# Patient Record
Sex: FEMALE | Race: OTHER | Employment: UNEMPLOYED | URBAN - METROPOLITAN AREA
[De-identification: names, ages, dates, MRNs, and addresses within clinical notes are randomized per-mention and may not be internally consistent; named-entity substitution may affect disease eponyms.]

---

## 2020-05-03 ENCOUNTER — APPOINTMENT (OUTPATIENT)
Dept: CT IMAGING | Age: 20
End: 2020-05-03
Attending: EMERGENCY MEDICINE
Payer: COMMERCIAL

## 2020-05-03 ENCOUNTER — APPOINTMENT (OUTPATIENT)
Dept: ULTRASOUND IMAGING | Age: 20
End: 2020-05-03
Attending: EMERGENCY MEDICINE
Payer: COMMERCIAL

## 2020-05-03 ENCOUNTER — HOSPITAL ENCOUNTER (EMERGENCY)
Age: 20
Discharge: HOME | End: 2020-05-03
Attending: EMERGENCY MEDICINE
Payer: COMMERCIAL

## 2020-05-03 VITALS
OXYGEN SATURATION: 98 % | DIASTOLIC BLOOD PRESSURE: 74 MMHG | HEART RATE: 119 BPM | WEIGHT: 130 LBS | TEMPERATURE: 98.9 F | BODY MASS INDEX: 27.29 KG/M2 | SYSTOLIC BLOOD PRESSURE: 108 MMHG | RESPIRATION RATE: 18 BRPM | HEIGHT: 58 IN

## 2020-05-03 DIAGNOSIS — R10.84 ABDOMINAL PAIN, GENERALIZED: Primary | ICD-10-CM

## 2020-05-03 DIAGNOSIS — N12 PYELONEPHRITIS: ICD-10-CM

## 2020-05-03 LAB
ALBUMIN SERPL-MCNC: 4.1 G/DL (ref 3.5–5)
ALBUMIN/GLOB SERPL: 0.8 {RATIO} (ref 1.2–3.5)
ALP SERPL-CCNC: 101 U/L (ref 50–136)
ALT SERPL-CCNC: 23 U/L (ref 12–65)
ANION GAP SERPL CALC-SCNC: 9 MMOL/L (ref 7–16)
AST SERPL-CCNC: 16 U/L (ref 15–37)
BACTERIA URNS QL MICRO: 0 /HPF
BASOPHILS # BLD: 0.1 K/UL (ref 0–0.2)
BASOPHILS NFR BLD: 0 % (ref 0–2)
BILIRUB SERPL-MCNC: 2.1 MG/DL (ref 0.2–1.1)
BUN SERPL-MCNC: 10 MG/DL (ref 6–23)
CALCIUM SERPL-MCNC: 9.7 MG/DL (ref 8.3–10.4)
CASTS URNS QL MICRO: ABNORMAL /LPF
CHLORIDE SERPL-SCNC: 101 MMOL/L (ref 98–107)
CO2 SERPL-SCNC: 25 MMOL/L (ref 21–32)
CREAT SERPL-MCNC: 0.85 MG/DL (ref 0.6–1)
DIFFERENTIAL METHOD BLD: ABNORMAL
EOSINOPHIL # BLD: 0 K/UL (ref 0–0.8)
EOSINOPHIL NFR BLD: 0 % (ref 0.5–7.8)
EPI CELLS #/AREA URNS HPF: ABNORMAL /HPF
ERYTHROCYTE [DISTWIDTH] IN BLOOD BY AUTOMATED COUNT: 13.2 % (ref 11.9–14.6)
GLOBULIN SER CALC-MCNC: 4.9 G/DL (ref 2.3–3.5)
GLUCOSE SERPL-MCNC: 119 MG/DL (ref 65–100)
HCG UR QL: NEGATIVE
HCT VFR BLD AUTO: 42.3 % (ref 35.8–46.3)
HGB BLD-MCNC: 13.7 G/DL (ref 11.7–15.4)
IMM GRANULOCYTES # BLD AUTO: 0.1 K/UL (ref 0–0.5)
IMM GRANULOCYTES NFR BLD AUTO: 1 % (ref 0–5)
LACTATE SERPL-SCNC: 1.2 MMOL/L (ref 0.4–2)
LIPASE SERPL-CCNC: 38 U/L (ref 73–393)
LYMPHOCYTES # BLD: 0.9 K/UL (ref 0.5–4.6)
LYMPHOCYTES NFR BLD: 4 % (ref 13–44)
MCH RBC QN AUTO: 28.2 PG (ref 26.1–32.9)
MCHC RBC AUTO-ENTMCNC: 32.4 G/DL (ref 31.4–35)
MCV RBC AUTO: 87.2 FL (ref 79.6–97.8)
MONOCYTES # BLD: 1.9 K/UL (ref 0.1–1.3)
MONOCYTES NFR BLD: 8 % (ref 4–12)
NEUTS SEG # BLD: 21.6 K/UL (ref 1.7–8.2)
NEUTS SEG NFR BLD: 88 % (ref 43–78)
NRBC # BLD: 0 K/UL (ref 0–0.2)
PLATELET # BLD AUTO: 325 K/UL (ref 150–450)
PMV BLD AUTO: 10.6 FL (ref 9.4–12.3)
POTASSIUM SERPL-SCNC: 3.2 MMOL/L (ref 3.5–5.1)
PROT SERPL-MCNC: 9 G/DL (ref 6.3–8.2)
RBC # BLD AUTO: 4.85 M/UL (ref 4.05–5.2)
RBC #/AREA URNS HPF: ABNORMAL /HPF
SERVICE CMNT-IMP: NORMAL
SODIUM SERPL-SCNC: 135 MMOL/L (ref 136–145)
WBC # BLD AUTO: 24.6 K/UL (ref 4.3–11.1)
WBC URNS QL MICRO: >100 /HPF
WET PREP GENITAL: NORMAL
WET PREP GENITAL: NORMAL

## 2020-05-03 PROCEDURE — 74011250636 HC RX REV CODE- 250/636: Performed by: EMERGENCY MEDICINE

## 2020-05-03 PROCEDURE — 83605 ASSAY OF LACTIC ACID: CPT

## 2020-05-03 PROCEDURE — 87205 SMEAR GRAM STAIN: CPT

## 2020-05-03 PROCEDURE — 99284 EMERGENCY DEPT VISIT MOD MDM: CPT

## 2020-05-03 PROCEDURE — 96375 TX/PRO/DX INJ NEW DRUG ADDON: CPT

## 2020-05-03 PROCEDURE — 96365 THER/PROPH/DIAG IV INF INIT: CPT

## 2020-05-03 PROCEDURE — 87040 BLOOD CULTURE FOR BACTERIA: CPT

## 2020-05-03 PROCEDURE — 76856 US EXAM PELVIC COMPLETE: CPT

## 2020-05-03 PROCEDURE — 87186 SC STD MICRODIL/AGAR DIL: CPT

## 2020-05-03 PROCEDURE — 87591 N.GONORRHOEAE DNA AMP PROB: CPT

## 2020-05-03 PROCEDURE — 81003 URINALYSIS AUTO W/O SCOPE: CPT

## 2020-05-03 PROCEDURE — 85025 COMPLETE CBC W/AUTO DIFF WBC: CPT

## 2020-05-03 PROCEDURE — 81015 MICROSCOPIC EXAM OF URINE: CPT

## 2020-05-03 PROCEDURE — 80053 COMPREHEN METABOLIC PANEL: CPT

## 2020-05-03 PROCEDURE — 81025 URINE PREGNANCY TEST: CPT

## 2020-05-03 PROCEDURE — 83690 ASSAY OF LIPASE: CPT

## 2020-05-03 PROCEDURE — 87210 SMEAR WET MOUNT SALINE/INK: CPT

## 2020-05-03 PROCEDURE — 87088 URINE BACTERIA CULTURE: CPT

## 2020-05-03 PROCEDURE — 74011000258 HC RX REV CODE- 258: Performed by: EMERGENCY MEDICINE

## 2020-05-03 PROCEDURE — 87086 URINE CULTURE/COLONY COUNT: CPT

## 2020-05-03 PROCEDURE — 74176 CT ABD & PELVIS W/O CONTRAST: CPT

## 2020-05-03 PROCEDURE — 74011250637 HC RX REV CODE- 250/637: Performed by: EMERGENCY MEDICINE

## 2020-05-03 PROCEDURE — 76705 ECHO EXAM OF ABDOMEN: CPT

## 2020-05-03 PROCEDURE — 87150 DNA/RNA AMPLIFIED PROBE: CPT

## 2020-05-03 PROCEDURE — 87077 CULTURE AEROBIC IDENTIFY: CPT

## 2020-05-03 RX ORDER — ONDANSETRON 4 MG/1
4 TABLET, ORALLY DISINTEGRATING ORAL
Qty: 11 TAB | Refills: 1 | Status: SHIPPED | OUTPATIENT
Start: 2020-05-03 | End: 2020-05-07

## 2020-05-03 RX ORDER — ACETAMINOPHEN 500 MG
1000 TABLET ORAL
Status: COMPLETED | OUTPATIENT
Start: 2020-05-03 | End: 2020-05-03

## 2020-05-03 RX ORDER — ONDANSETRON 2 MG/ML
4 INJECTION INTRAMUSCULAR; INTRAVENOUS
Status: COMPLETED | OUTPATIENT
Start: 2020-05-03 | End: 2020-05-03

## 2020-05-03 RX ORDER — SODIUM CHLORIDE 0.9 % (FLUSH) 0.9 %
10 SYRINGE (ML) INJECTION
Status: DISCONTINUED | OUTPATIENT
Start: 2020-05-03 | End: 2020-05-04 | Stop reason: HOSPADM

## 2020-05-03 RX ORDER — HYDROMORPHONE HYDROCHLORIDE 1 MG/ML
0.5 INJECTION, SOLUTION INTRAMUSCULAR; INTRAVENOUS; SUBCUTANEOUS
Status: COMPLETED | OUTPATIENT
Start: 2020-05-03 | End: 2020-05-03

## 2020-05-03 RX ORDER — CEPHALEXIN 500 MG/1
500 CAPSULE ORAL 4 TIMES DAILY
Qty: 28 CAP | Refills: 0 | Status: SHIPPED | OUTPATIENT
Start: 2020-05-03 | End: 2020-05-04

## 2020-05-03 RX ADMIN — ACETAMINOPHEN 1000 MG: 500 TABLET ORAL at 21:59

## 2020-05-03 RX ADMIN — SODIUM CHLORIDE 1000 ML: 900 INJECTION, SOLUTION INTRAVENOUS at 18:39

## 2020-05-03 RX ADMIN — ONDANSETRON 4 MG: 2 INJECTION INTRAMUSCULAR; INTRAVENOUS at 18:40

## 2020-05-03 RX ADMIN — CEFTRIAXONE 1 G: 1 INJECTION, POWDER, FOR SOLUTION INTRAMUSCULAR; INTRAVENOUS at 21:59

## 2020-05-03 RX ADMIN — HYDROMORPHONE HYDROCHLORIDE 0.5 MG: 1 INJECTION, SOLUTION INTRAMUSCULAR; INTRAVENOUS; SUBCUTANEOUS at 18:40

## 2020-05-03 NOTE — ED PROVIDER NOTES
Patient is a 65-year-old female who presents with nausea, vomiting, abdominal and lower back pain and fevers. Patient states symptoms began last night, states gradually worsening today. Denies any vaginal bleeding or discharge, no chest pain, no cough, no neck pain or stiffness, no headache, no further complaints. No past medical history on file. No past surgical history on file. No family history on file. Social History     Socioeconomic History    Marital status: SINGLE     Spouse name: Not on file    Number of children: Not on file    Years of education: Not on file    Highest education level: Not on file   Occupational History    Not on file   Social Needs    Financial resource strain: Not on file    Food insecurity     Worry: Not on file     Inability: Not on file    Transportation needs     Medical: Not on file     Non-medical: Not on file   Tobacco Use    Smoking status: Not on file   Substance and Sexual Activity    Alcohol use: Not on file    Drug use: Not on file    Sexual activity: Not on file   Lifestyle    Physical activity     Days per week: Not on file     Minutes per session: Not on file    Stress: Not on file   Relationships    Social connections     Talks on phone: Not on file     Gets together: Not on file     Attends Yazdanism service: Not on file     Active member of club or organization: Not on file     Attends meetings of clubs or organizations: Not on file     Relationship status: Not on file    Intimate partner violence     Fear of current or ex partner: Not on file     Emotionally abused: Not on file     Physically abused: Not on file     Forced sexual activity: Not on file   Other Topics Concern    Not on file   Social History Narrative    Not on file         ALLERGIES: Patient has no known allergies. Review of Systems   Constitutional: Positive for chills and fever. HENT: Negative for rhinorrhea and sore throat.     Eyes: Negative for visual disturbance. Respiratory: Negative for cough and shortness of breath. Cardiovascular: Negative for chest pain and leg swelling. Gastrointestinal: Positive for abdominal pain, nausea and vomiting. Negative for diarrhea. Genitourinary: Negative for dysuria, vaginal bleeding and vaginal discharge. Musculoskeletal: Negative for neck pain. Skin: Negative for rash. Neurological: Negative for weakness and headaches. Psychiatric/Behavioral: The patient is not nervous/anxious. Vitals:    05/03/20 1734   BP: 124/78   Pulse: (!) 121   Resp: 16   Temp: 100 °F (37.8 °C)   SpO2: 96%   Weight: 59 kg (130 lb)   Height: 4' 10\" (1.473 m)            Physical Exam  Vitals signs and nursing note reviewed. Constitutional:       Appearance: She is well-developed. HENT:      Head: Normocephalic. Right Ear: External ear normal.      Left Ear: External ear normal.   Eyes:      Conjunctiva/sclera: Conjunctivae normal.      Pupils: Pupils are equal, round, and reactive to light. Neck:      Musculoskeletal: Normal range of motion and neck supple. Trachea: No tracheal deviation. Cardiovascular:      Rate and Rhythm: Normal rate and regular rhythm. Pulmonary:      Effort: Pulmonary effort is normal. No respiratory distress. Abdominal:      General: There is no distension. Palpations: Abdomen is soft. Tenderness: There is abdominal tenderness (Mild lower abdominal tenderness without rebound or guarding in bilateral lower abdomen. ). There is no guarding or rebound. Musculoskeletal: Normal range of motion. Skin:     Findings: No rash. Neurological:      Mental Status: She is alert and oriented to person, place, and time. Cranial Nerves: No cranial nerve deficit.           MDM  Number of Diagnoses or Management Options  Abdominal pain, generalized:   Diagnosis management comments: 10:24 PM  Patient had been seen and evaluated by Dr. Jeni Trevizo the hospitalist came to admit her and patient informed the hospitalist that she could not be admitted tonight because she did could not find someone to keep her children. She is feeling much better now. She has received Rocephin for the pyelonephritis. Patient understands the severity of her infection. I will prescribe some Zofran and oral antibiotic and advised her to please come back if it gets worse. She states if that is the case she will come back tomorrow. Ray Marks MD         Amount and/or Complexity of Data Reviewed  Clinical lab tests: ordered and reviewed  Tests in the radiology section of CPT®: ordered and reviewed  Tests in the medicine section of CPT®: ordered and reviewed  Review and summarize past medical records: yes    Risk of Complications, Morbidity, and/or Mortality  Presenting problems: high  Diagnostic procedures: high  Management options: high    Patient Progress  Patient progress: stable         Procedures  Recent Results (from the past 12 hour(s))   CBC WITH AUTOMATED DIFF    Collection Time: 05/03/20  5:37 PM   Result Value Ref Range    WBC 24.6 (H) 4.3 - 11.1 K/uL    RBC 4.85 4.05 - 5.2 M/uL    HGB 13.7 11.7 - 15.4 g/dL    HCT 42.3 35.8 - 46.3 %    MCV 87.2 79.6 - 97.8 FL    MCH 28.2 26.1 - 32.9 PG    MCHC 32.4 31.4 - 35.0 g/dL    RDW 13.2 11.9 - 14.6 %    PLATELET 284 562 - 251 K/uL    MPV 10.6 9.4 - 12.3 FL    ABSOLUTE NRBC 0.00 0.0 - 0.2 K/uL    DF AUTOMATED      NEUTROPHILS 88 (H) 43 - 78 %    LYMPHOCYTES 4 (L) 13 - 44 %    MONOCYTES 8 4.0 - 12.0 %    EOSINOPHILS 0 (L) 0.5 - 7.8 %    BASOPHILS 0 0.0 - 2.0 %    IMMATURE GRANULOCYTES 1 0.0 - 5.0 %    ABS. NEUTROPHILS 21.6 (H) 1.7 - 8.2 K/UL    ABS. LYMPHOCYTES 0.9 0.5 - 4.6 K/UL    ABS. MONOCYTES 1.9 (H) 0.1 - 1.3 K/UL    ABS. EOSINOPHILS 0.0 0.0 - 0.8 K/UL    ABS. BASOPHILS 0.1 0.0 - 0.2 K/UL    ABS. IMM.  GRANS. 0.1 0.0 - 0.5 K/UL   METABOLIC PANEL, COMPREHENSIVE    Collection Time: 05/03/20  5:37 PM   Result Value Ref Range    Sodium 135 (L) 136 - 145 mmol/L Potassium 3.2 (L) 3.5 - 5.1 mmol/L    Chloride 101 98 - 107 mmol/L    CO2 25 21 - 32 mmol/L    Anion gap 9 7 - 16 mmol/L    Glucose 119 (H) 65 - 100 mg/dL    BUN 10 6 - 23 MG/DL    Creatinine 0.85 0.6 - 1.0 MG/DL    GFR est AA >60 >60 ml/min/1.73m2    GFR est non-AA >60 >60 ml/min/1.73m2    Calcium 9.7 8.3 - 10.4 MG/DL    Bilirubin, total 2.1 (H) 0.2 - 1.1 MG/DL    ALT (SGPT) 23 12 - 65 U/L    AST (SGOT) 16 15 - 37 U/L    Alk. phosphatase 101 50 - 136 U/L    Protein, total 9.0 (H) 6.3 - 8.2 g/dL    Albumin 4.1 3.5 - 5.0 g/dL    Globulin 4.9 (H) 2.3 - 3.5 g/dL    A-G Ratio 0.8 (L) 1.2 - 3.5     Ct Abd Pelv Wo Cont    Result Date: 5/3/2020  CT ABDOMEN AND PELVIS WITHOUT CONTRAST HISTORY: Nausea, vomiting, diarrhea and fever. COMPARISON: None TECHNIQUE: Helical imaging was performed from the lung bases through the ischial tuberosities without intravenous contrast. Oral contrast was not administered. Coronal and sagittal reformats were performed. Dose reduction techniques used: Automated exposure control, adjustment of the mAs and/or kVp according to patient's size, and iterative reconstruction techniques. FINDINGS: *  LUNG BASES: Within normal limits. *  LIVER: Within normal limits. *  GALLBLADDER AND BILE DUCTS: Normal. *  SPLEEN: Within normal limits. *  URINARY TRACT: Within normal limits. *  ADRENALS: Within normal limits. *  PANCREAS: Within normal limits. *  GASTROINTESTINAL TRACT: Within normal limits. *  RETROPERITONEUM: Within normal limits. *  PERITONEAL CAVITY AND ABDOMINAL WALL: Within normal limits. *  PELVIS: IUD within the endometrial canal. 3.1 x 2.7 cm right ovarian cyst. Small amount of cul-de-sac fluid. *  SPINE / BONES: Within normal limits. *  OTHER COMMENTS: None. IMPRESSION: Right ovarian cyst with a small amount of cul-de-sac fluid.  IUD within the endometrial canal.  Evaluation of the abdominal viscera is limited without intravenous contrast. Date of Dictation: 5/3/2020 7:55 PM     [employer]    Result Date: 5/3/2020  Right upper quadrant abdominal ultrasound History: gallbladder-left lower quadrant abd pain for 3 days, fever, elevated WBC, 19 years Female nausea vomiting diarrhea Comparison: None available. Findings: The liver is normal in echotexture and there is no dilatation of the intrahepatic ducts or evidence of focal mass. The liver measures 16.4 cm in midclavicular length. Normal hepatopedal flow within the main portal vein. The gallbladder is normal-appearing. No evidence of cholelithiasis, gallbladder wall thickening, or pericholecystic fluid. Negative sonographic [place] sign. The proximal common duct is not dilated and measures 4 mm. The right kidney is normal appearing and measures 9.2 cm in length. Partially visualized head and body of the pancreas appear unremarkable. The visualized portions of the IVC and aorta are unremarkable. No ascites or right pleural effusion. Impression: Normal study. Us Pelv Non Obs    Result Date: 5/3/2020  Examination:  Transabdominal ultrasound for evaluation of the pelvic contents. History: rule out torsion, 19 years Female; Patient's last menstrual period was unknown. Negative pregnancy test.  Patient has IUD. Presents with frequency, irregular menses, left lower quadrant pain for 3 days. Patient declined transvaginal imaging. Comparison: None available Findings: Uterus measures approximately 8.2 x 3.8 x 6.6 cm. Myometrium has a background normal uniform echotexture. Endometrial stripe measures approximately 0.4 cm. An intrauterine device appears in relative anatomic position within the endometrial cavity. The right and left ovary were well-visualized. Right ovary measures approximately 5.2 x 3.5 x 2.9 cm, and appears to contain a simple appearing cyst measuring 2.4 x 3.7 x 2.9 cm, without flow, likely functional. Left ovary measures approximately 2.8 x 1.9 x 2.0 cm. Normal Doppler flow in bilateral ovaries.   Trace simple free fluid was seen within the cul-de-sac, likely physiologic. Impression:  No acute pathology identified. Intrauterine device appears in relative anatomic position. Probable right ovarian functional cyst.      65-year-old female with pyelo:      Elevated WBC with flank pain and UTI concerning for pyelo in setting of normal workup as above so discussed with hospitalist for admission for IV abx and further management given persistent vomiting and inability to tolerate oral intake.

## 2020-05-03 NOTE — ED TRIAGE NOTES
Pt ambulatory to triage without complications. Pt reports n/v/d, chills, and fever since last night. Pt denies cough and SOB. Pt reports urinary frequency and burning upon urination but states is getting better. Pt states she is on bc that makes her period irregular and had period last month for about 3 weeks. Pt has IUD. Pt reports lower back pain and had lower abd pain last night.

## 2020-05-04 ENCOUNTER — HOSPITAL ENCOUNTER (INPATIENT)
Age: 20
LOS: 3 days | Discharge: HOME | DRG: 690 | End: 2020-05-07
Attending: EMERGENCY MEDICINE | Admitting: INTERNAL MEDICINE
Payer: COMMERCIAL

## 2020-05-04 DIAGNOSIS — R11.2 INTRACTABLE NAUSEA AND VOMITING: ICD-10-CM

## 2020-05-04 DIAGNOSIS — N12 PYELONEPHRITIS: Primary | ICD-10-CM

## 2020-05-04 DIAGNOSIS — R78.81 E COLI BACTEREMIA: ICD-10-CM

## 2020-05-04 DIAGNOSIS — D72.829 LEUKOCYTOSIS, UNSPECIFIED TYPE: ICD-10-CM

## 2020-05-04 DIAGNOSIS — B96.20 E COLI BACTEREMIA: ICD-10-CM

## 2020-05-04 PROBLEM — E87.6 HYPOKALEMIA: Status: ACTIVE | Noted: 2020-05-04

## 2020-05-04 LAB
ACC. NO. FROM MICRO ORDER, ACCP: ABNORMAL
ALBUMIN SERPL-MCNC: 3.5 G/DL (ref 3.5–5)
ALBUMIN/GLOB SERPL: 0.7 {RATIO} (ref 1.2–3.5)
ALP SERPL-CCNC: 100 U/L (ref 50–136)
ALT SERPL-CCNC: 23 U/L (ref 12–65)
ANION GAP SERPL CALC-SCNC: 11 MMOL/L (ref 7–16)
APPEARANCE UR: ABNORMAL
AST SERPL-CCNC: 19 U/L (ref 15–37)
BACTERIA URNS QL MICRO: 0 /HPF
BASOPHILS # BLD: 0.1 K/UL (ref 0–0.2)
BASOPHILS NFR BLD: 0 % (ref 0–2)
BILIRUB SERPL-MCNC: 2 MG/DL (ref 0.2–1.1)
BILIRUB UR QL: ABNORMAL
BUN SERPL-MCNC: 9 MG/DL (ref 6–23)
CALCIUM SERPL-MCNC: 9.1 MG/DL (ref 8.3–10.4)
CHLORIDE SERPL-SCNC: 98 MMOL/L (ref 98–107)
CO2 SERPL-SCNC: 25 MMOL/L (ref 21–32)
COLOR UR: ABNORMAL
CREAT SERPL-MCNC: 0.86 MG/DL (ref 0.6–1)
DIFFERENTIAL METHOD BLD: ABNORMAL
EOSINOPHIL # BLD: 0 K/UL (ref 0–0.8)
EOSINOPHIL NFR BLD: 0 % (ref 0.5–7.8)
EPI CELLS #/AREA URNS HPF: ABNORMAL /HPF
ERYTHROCYTE [DISTWIDTH] IN BLOOD BY AUTOMATED COUNT: 13.4 % (ref 11.9–14.6)
ESCHERICHIA COLI: DETECTED
GLOBULIN SER CALC-MCNC: 4.8 G/DL (ref 2.3–3.5)
GLUCOSE SERPL-MCNC: 110 MG/DL (ref 65–100)
GLUCOSE UR STRIP.AUTO-MCNC: 100 MG/DL
HCT VFR BLD AUTO: 38.6 % (ref 35.8–46.3)
HGB BLD-MCNC: 12.6 G/DL (ref 11.7–15.4)
HGB UR QL STRIP: ABNORMAL
IMM GRANULOCYTES # BLD AUTO: 0.2 K/UL (ref 0–0.5)
IMM GRANULOCYTES NFR BLD AUTO: 1 % (ref 0–5)
INTERPRETATION: ABNORMAL
KETONES UR QL STRIP.AUTO: NEGATIVE MG/DL
KPC (CARBAPENEM RESISTANCE GENE): NOT DETECTED
LACTATE SERPL-SCNC: 1.6 MMOL/L (ref 0.4–2)
LEUKOCYTE ESTERASE UR QL STRIP.AUTO: ABNORMAL
LYMPHOCYTES # BLD: 0.9 K/UL (ref 0.5–4.6)
LYMPHOCYTES NFR BLD: 3 % (ref 13–44)
MAGNESIUM SERPL-MCNC: 2 MG/DL (ref 1.8–2.4)
MCH RBC QN AUTO: 28.3 PG (ref 26.1–32.9)
MCHC RBC AUTO-ENTMCNC: 32.6 G/DL (ref 31.4–35)
MCV RBC AUTO: 86.5 FL (ref 79.6–97.8)
MONOCYTES # BLD: 2.7 K/UL (ref 0.1–1.3)
MONOCYTES NFR BLD: 10 % (ref 4–12)
NEUTS SEG # BLD: 23.2 K/UL (ref 1.7–8.2)
NEUTS SEG NFR BLD: 86 % (ref 43–78)
NITRITE UR QL STRIP.AUTO: NEGATIVE
NRBC # BLD: 0 K/UL (ref 0–0.2)
PH UR STRIP: 6.5 [PH] (ref 5–9)
PLATELET # BLD AUTO: 268 K/UL (ref 150–450)
PMV BLD AUTO: 11 FL (ref 9.4–12.3)
POTASSIUM SERPL-SCNC: 3 MMOL/L (ref 3.5–5.1)
PROT SERPL-MCNC: 8.3 G/DL (ref 6.3–8.2)
PROT UR STRIP-MCNC: 100 MG/DL
RBC # BLD AUTO: 4.46 M/UL (ref 4.05–5.2)
RBC #/AREA URNS HPF: ABNORMAL /HPF
SODIUM SERPL-SCNC: 134 MMOL/L (ref 136–145)
SP GR UR REFRACTOMETRY: 1.02 (ref 1–1.02)
UROBILINOGEN UR QL STRIP.AUTO: 2 EU/DL (ref 0.2–1)
WBC # BLD AUTO: 27.1 K/UL (ref 4.3–11.1)
WBC URNS QL MICRO: ABNORMAL /HPF

## 2020-05-04 PROCEDURE — 74011250636 HC RX REV CODE- 250/636: Performed by: EMERGENCY MEDICINE

## 2020-05-04 PROCEDURE — 81001 URINALYSIS AUTO W/SCOPE: CPT

## 2020-05-04 PROCEDURE — 83605 ASSAY OF LACTIC ACID: CPT

## 2020-05-04 PROCEDURE — 85025 COMPLETE CBC W/AUTO DIFF WBC: CPT

## 2020-05-04 PROCEDURE — 83735 ASSAY OF MAGNESIUM: CPT

## 2020-05-04 PROCEDURE — 99284 EMERGENCY DEPT VISIT MOD MDM: CPT

## 2020-05-04 PROCEDURE — 77030040361 HC SLV COMPR DVT MDII -B

## 2020-05-04 PROCEDURE — 74011000258 HC RX REV CODE- 258: Performed by: EMERGENCY MEDICINE

## 2020-05-04 PROCEDURE — 80053 COMPREHEN METABOLIC PANEL: CPT

## 2020-05-04 PROCEDURE — 74011250637 HC RX REV CODE- 250/637: Performed by: INTERNAL MEDICINE

## 2020-05-04 PROCEDURE — 74011250636 HC RX REV CODE- 250/636: Performed by: INTERNAL MEDICINE

## 2020-05-04 PROCEDURE — 96374 THER/PROPH/DIAG INJ IV PUSH: CPT

## 2020-05-04 PROCEDURE — 65270000029 HC RM PRIVATE

## 2020-05-04 PROCEDURE — 87040 BLOOD CULTURE FOR BACTERIA: CPT

## 2020-05-04 RX ORDER — SODIUM CHLORIDE 0.9 % (FLUSH) 0.9 %
5-40 SYRINGE (ML) INJECTION EVERY 8 HOURS
Status: DISCONTINUED | OUTPATIENT
Start: 2020-05-04 | End: 2020-05-07 | Stop reason: HOSPADM

## 2020-05-04 RX ORDER — SODIUM CHLORIDE 9 MG/ML
100 INJECTION, SOLUTION INTRAVENOUS CONTINUOUS
Status: DISCONTINUED | OUTPATIENT
Start: 2020-05-04 | End: 2020-05-04

## 2020-05-04 RX ORDER — ONDANSETRON 2 MG/ML
4 INJECTION INTRAMUSCULAR; INTRAVENOUS
Status: DISCONTINUED | OUTPATIENT
Start: 2020-05-04 | End: 2020-05-07 | Stop reason: HOSPADM

## 2020-05-04 RX ORDER — ACETAMINOPHEN 325 MG/1
650 TABLET ORAL
Status: DISCONTINUED | OUTPATIENT
Start: 2020-05-04 | End: 2020-05-07 | Stop reason: HOSPADM

## 2020-05-04 RX ORDER — SODIUM CHLORIDE 0.9 % (FLUSH) 0.9 %
5-40 SYRINGE (ML) INJECTION AS NEEDED
Status: DISCONTINUED | OUTPATIENT
Start: 2020-05-04 | End: 2020-05-07 | Stop reason: HOSPADM

## 2020-05-04 RX ORDER — SODIUM CHLORIDE 9 MG/ML
100 INJECTION, SOLUTION INTRAVENOUS CONTINUOUS
Status: DISPENSED | OUTPATIENT
Start: 2020-05-04 | End: 2020-05-05

## 2020-05-04 RX ORDER — AMOXICILLIN 250 MG
2 CAPSULE ORAL
Status: DISCONTINUED | OUTPATIENT
Start: 2020-05-04 | End: 2020-05-07 | Stop reason: HOSPADM

## 2020-05-04 RX ORDER — POTASSIUM CHLORIDE 20 MEQ/1
40 TABLET, EXTENDED RELEASE ORAL
Status: COMPLETED | OUTPATIENT
Start: 2020-05-04 | End: 2020-05-04

## 2020-05-04 RX ORDER — LORAZEPAM 0.5 MG/1
0.5 TABLET ORAL
Status: DISCONTINUED | OUTPATIENT
Start: 2020-05-04 | End: 2020-05-07 | Stop reason: HOSPADM

## 2020-05-04 RX ORDER — DIPHENHYDRAMINE HCL 25 MG
25 CAPSULE ORAL
Status: DISCONTINUED | OUTPATIENT
Start: 2020-05-04 | End: 2020-05-07 | Stop reason: HOSPADM

## 2020-05-04 RX ORDER — NALOXONE HYDROCHLORIDE 0.4 MG/ML
0.4 INJECTION, SOLUTION INTRAMUSCULAR; INTRAVENOUS; SUBCUTANEOUS AS NEEDED
Status: DISCONTINUED | OUTPATIENT
Start: 2020-05-04 | End: 2020-05-07 | Stop reason: HOSPADM

## 2020-05-04 RX ADMIN — ACETAMINOPHEN 650 MG: 325 TABLET, FILM COATED ORAL at 16:08

## 2020-05-04 RX ADMIN — POTASSIUM CHLORIDE 40 MEQ: 20 TABLET, EXTENDED RELEASE ORAL at 16:49

## 2020-05-04 RX ADMIN — SODIUM CHLORIDE 100 ML/HR: 9 INJECTION, SOLUTION INTRAVENOUS at 16:06

## 2020-05-04 RX ADMIN — ONDANSETRON 4 MG: 2 INJECTION INTRAMUSCULAR; INTRAVENOUS at 16:43

## 2020-05-04 RX ADMIN — Medication 10 ML: at 20:48

## 2020-05-04 RX ADMIN — CEFTRIAXONE 1 G: 1 INJECTION, POWDER, FOR SOLUTION INTRAMUSCULAR; INTRAVENOUS at 13:37

## 2020-05-04 RX ADMIN — ACETAMINOPHEN 650 MG: 325 TABLET, FILM COATED ORAL at 20:47

## 2020-05-04 RX ADMIN — SODIUM CHLORIDE 100 ML/HR: 9 INJECTION, SOLUTION INTRAVENOUS at 16:47

## 2020-05-04 RX ADMIN — SODIUM CHLORIDE 1000 ML: 9 INJECTION, SOLUTION INTRAVENOUS at 13:37

## 2020-05-04 NOTE — ED PROVIDER NOTES
Patient was seen here yesterday and diagnosed with pyelonephritis. She states we wanted to admit her but she had no childcare. She received IV antibiotics and fluids and was feeling much better and went home. She states that she has been taking her nausea medicine but continues to vomit. Anytime she drinks something she vomits. She continues to have back pain and states that her fever has come back. Since she was feeling worse and unable to keep anything down, she came back for reevaluation. No past medical history on file. No past surgical history on file. No family history on file.     Social History     Socioeconomic History    Marital status: SINGLE     Spouse name: Not on file    Number of children: Not on file    Years of education: Not on file    Highest education level: Not on file   Occupational History    Not on file   Social Needs    Financial resource strain: Not on file    Food insecurity     Worry: Not on file     Inability: Not on file    Transportation needs     Medical: Not on file     Non-medical: Not on file   Tobacco Use    Smoking status: Not on file   Substance and Sexual Activity    Alcohol use: Not on file    Drug use: Not on file    Sexual activity: Not on file   Lifestyle    Physical activity     Days per week: Not on file     Minutes per session: Not on file    Stress: Not on file   Relationships    Social connections     Talks on phone: Not on file     Gets together: Not on file     Attends Anabaptism service: Not on file     Active member of club or organization: Not on file     Attends meetings of clubs or organizations: Not on file     Relationship status: Not on file    Intimate partner violence     Fear of current or ex partner: Not on file     Emotionally abused: Not on file     Physically abused: Not on file     Forced sexual activity: Not on file   Other Topics Concern    Not on file   Social History Narrative    Not on file ALLERGIES: Patient has no known allergies. Review of Systems   Constitutional: Positive for chills and fever. Gastrointestinal: Positive for nausea and vomiting. All other systems reviewed and are negative. Vitals:    05/04/20 1232 05/04/20 1300   BP: 104/70 112/69   Pulse: (!) 105 (!) 116   Resp: 20    Temp: 100.3 °F (37.9 °C)    SpO2: 97% 98%   Weight: 59 kg (130 lb)    Height: 4' 10\" (1.473 m)             Physical Exam  Vitals signs and nursing note reviewed. Constitutional:       Appearance: Normal appearance. She is well-developed. HENT:      Head: Normocephalic and atraumatic. Eyes:      Conjunctiva/sclera: Conjunctivae normal.      Pupils: Pupils are equal, round, and reactive to light. Neck:      Musculoskeletal: Normal range of motion and neck supple. Cardiovascular:      Rate and Rhythm: Regular rhythm. Tachycardia present. Heart sounds: Normal heart sounds. Pulmonary:      Effort: Pulmonary effort is normal. No respiratory distress. Breath sounds: Normal breath sounds. Abdominal:      General: Bowel sounds are normal. There is no distension. Palpations: Abdomen is soft. Tenderness: There is no abdominal tenderness. Musculoskeletal:         General: No tenderness. Skin:     General: Skin is warm and dry. Neurological:      Mental Status: She is alert and oriented to person, place, and time. Psychiatric:         Mood and Affect: Mood normal.         Behavior: Behavior normal.          MDM  Number of Diagnoses or Management Options  E coli bacteremia: new and requires workup  Intractable nausea and vomiting: new and requires workup  Leukocytosis, unspecified type: new and requires workup  Pyelonephritis: new and requires workup  Diagnosis management comments: 1:38 PM per old records, her recent blood culture grew E. coli. Patient has worsened at home. We tried to admit her yesterday but she could not stay as she had no childcare.   She states that she will stay today. I have texted the hospitalist, IV antibiotics have been started along with IV fluids.        Amount and/or Complexity of Data Reviewed  Clinical lab tests: ordered and reviewed  Decide to obtain previous medical records or to obtain history from someone other than the patient: yes  Review and summarize past medical records: yes  Discuss the patient with other providers: yes    Risk of Complications, Morbidity, and/or Mortality  Presenting problems: moderate  Diagnostic procedures: moderate  Management options: moderate    Patient Progress  Patient progress: improved         Procedures

## 2020-05-04 NOTE — ED NOTES
I have reviewed discharge instructions with the patient. The patient verbalized understanding. Patient left ED via Discharge Method: wheelchair to Home with family member    Opportunity for questions and clarification provided. Patient given 2 scripts. To continue your aftercare when you leave the hospital, you may receive an automated call from our care team to check in on how you are doing. This is a free service and part of our promise to provide the best care and service to meet your aftercare needs.  If you have questions, or wish to unsubscribe from this service please call [phone]. Thank you for Choosing our [facility].

## 2020-05-04 NOTE — PROGRESS NOTES
TRANSFER - IN REPORT:    Verbal report received from [facility] [employer]  being received from ED for routine progression of care      Report consisted of patients Situation, Background, Assessment and   Recommendations(SBAR). Information from the following report(s) SBAR was reviewed with the receiving nurse. Opportunity for questions and clarification was provided. Assessment completed upon patients arrival to unit and care assumed.

## 2020-05-04 NOTE — H&P
Hospitalist H&P Note     Admit Date:  2020 12:44 PM   Name:  Viki Kocher   Age:  20 y.o.  :  2000   MRN:  [id number]   PCP:  None  Treatment Team: Attending Provider: Claudean Blalock, MD    HPI/Subjective:   Mendez Guerrero is a 20year old female with no significant past medical history who presented to ED with left flank pain, fever, nausea/vomiting x 2-3 days. Patient was seen in the ED yesterday and was to be admitted for Pyelonephritis. She wasn't able to stay due to  issues and was discharged with PO antibiotics. Since she went home, she has nausea and vomiting overnight without being able keep anything down PO. She reports some slight cough, generalized body pains, fever, chills and sweats overnight. She takes ibuprofen for pain with some minor relief but unable to control her pain now as she isnt able to keep anything down. In the ED, patient had slight temp of 100.3F and was tachycardic. Labs were notable for  WBC of 27.1, potassium of 3.0, lactic acid of 1.6. Today's UA showed small leukocyte esterase but dipstick from 5/3 showed >300 WBCs. Blood culture obtained on 5/3 showed GNRs. 10 systems reviewed and negative except as noted in HPI. History reviewed. No pertinent past medical history. Past Surgical History:   Procedure Laterality Date    HX TONSILLECTOMY        No Known Allergies   Social History     Tobacco Use    Smoking status: Current Some Day Smoker     Years: 2.00    Smokeless tobacco: Never Used    Tobacco comment: Vap    Substance Use Topics    Alcohol use: Yes      Family History   Problem Relation Age of Onset    No Known Problems Mother     No Known Problems Father         There is no immunization history on file for this patient. PTA Medications:  Prior to Admission Medications   Prescriptions Last Dose Informant Patient Reported?  Taking?   ondansetron (Zofran ODT) 4 mg disintegrating tablet   No No   Sig: Take 1 Tab by mouth every eight (8) hours as needed for Nausea. Facility-Administered Medications: None       Objective:     Patient Vitals for the past 24 hrs:   Temp Pulse Resp BP SpO2   05/04/20 1527 98.6 °F (37 °C) 95 19 123/66 96 %   05/04/20 1430    109/73    05/04/20 1400    110/70    05/04/20 1330    113/68    05/04/20 1300  (!) 116  112/69 98 %   05/04/20 1232 100.3 °F (37.9 °C) (!) 105 20 104/70 97 %     Oxygen Therapy  O2 Sat (%): 96 % (05/04/20 1527)  Pulse via Oximetry: 116 beats per minute (05/04/20 1300)    Estimated body mass index is 27.17 kg/m² as calculated from the following:    Height as of this encounter: 4' 10\" (1.473 m). Weight as of this encounter: 59 kg (130 lb). No intake or output data in the 24 hours ending 05/04/20 1640    *Note that automatically entered I/Os may not be accurate; dependent on patient compliance with collection and accurate  by assistants. Physical Exam:  General:     alert, awake, no acute distress. Well nourished. Warm to touch and diaphoretic  Head:   normocephalic, atraumatic  Eyes, Ears, nose: PERRL, EOMI. Normal Conjunctiva. Neck:    supple, non-tender. Trachea midline. Lungs:   CTAB, no wheezing, rhonchi, rales  Cardiac:   RRR, Normal S1 and S2. Abdomen:   Soft, non distended, nontender, +BS, + Left CVA tenderness  Extremities:   Warm, dry. No edema   Skin:   No rashes, no jaundice  Neuro:  Alert and oriented to person, time, place, situation.  No gross focal neurological deficit  Psychiatric:  No anxiety, calm, cooperative      Data Review:   Recent Results (from the past 24 hour(s))   CBC WITH AUTOMATED DIFF    Collection Time: 05/03/20  5:37 PM   Result Value Ref Range    WBC 24.6 (H) 4.3 - 11.1 K/uL    RBC 4.85 4.05 - 5.2 M/uL    HGB 13.7 11.7 - 15.4 g/dL    HCT 42.3 35.8 - 46.3 %    MCV 87.2 79.6 - 97.8 FL    MCH 28.2 26.1 - 32.9 PG    MCHC 32.4 31.4 - 35.0 g/dL    RDW 13.2 11.9 - 14.6 %    PLATELET 760 690 - 380 K/uL    MPV 10.6 9.4 - 12.3 FL    ABSOLUTE NRBC 0.00 0.0 - 0.2 K/uL    DF AUTOMATED      NEUTROPHILS 88 (H) 43 - 78 %    LYMPHOCYTES 4 (L) 13 - 44 %    MONOCYTES 8 4.0 - 12.0 %    EOSINOPHILS 0 (L) 0.5 - 7.8 %    BASOPHILS 0 0.0 - 2.0 %    IMMATURE GRANULOCYTES 1 0.0 - 5.0 %    ABS. NEUTROPHILS 21.6 (H) 1.7 - 8.2 K/UL    ABS. LYMPHOCYTES 0.9 0.5 - 4.6 K/UL    ABS. MONOCYTES 1.9 (H) 0.1 - 1.3 K/UL    ABS. EOSINOPHILS 0.0 0.0 - 0.8 K/UL    ABS. BASOPHILS 0.1 0.0 - 0.2 K/UL    ABS. IMM. GRANS. 0.1 0.0 - 0.5 K/UL   METABOLIC PANEL, COMPREHENSIVE    Collection Time: 05/03/20  5:37 PM   Result Value Ref Range    Sodium 135 (L) 136 - 145 mmol/L    Potassium 3.2 (L) 3.5 - 5.1 mmol/L    Chloride 101 98 - 107 mmol/L    CO2 25 21 - 32 mmol/L    Anion gap 9 7 - 16 mmol/L    Glucose 119 (H) 65 - 100 mg/dL    BUN 10 6 - 23 MG/DL    Creatinine 0.85 0.6 - 1.0 MG/DL    GFR est AA >60 >60 ml/min/1.73m2    GFR est non-AA >60 >60 ml/min/1.73m2    Calcium 9.7 8.3 - 10.4 MG/DL    Bilirubin, total 2.1 (H) 0.2 - 1.1 MG/DL    ALT (SGPT) 23 12 - 65 U/L    AST (SGOT) 16 15 - 37 U/L    Alk. phosphatase 101 50 - 136 U/L    Protein, total 9.0 (H) 6.3 - 8.2 g/dL    Albumin 4.1 3.5 - 5.0 g/dL    Globulin 4.9 (H) 2.3 - 3.5 g/dL    A-G Ratio 0.8 (L) 1.2 - 3.5     LIPASE    Collection Time: 05/03/20  5:37 PM   Result Value Ref Range    Lipase 38 (L) 73 - 393 U/L   URINE MICROSCOPIC    Collection Time: 05/03/20  6:51 PM   Result Value Ref Range    WBC >100 (H) 0 /hpf    RBC 0-3 0 /hpf    Epithelial cells 0-3 0 /hpf    Bacteria 0 0 /hpf    Casts 0-3 0 /lpf   CULTURE, URINE    Collection Time: 05/03/20  6:51 PM   Result Value Ref Range    Special Requests: NO SPECIAL REQUESTS      Culture result:        NO GROWTH AFTER SHORT PERIOD OF INCUBATION. FURTHER RESULTS TO FOLLOW AFTER OVERNIGHT INCUBATION.    HCG URINE, QL. - POC    Collection Time: 05/03/20  6:52 PM   Result Value Ref Range    Pregnancy test,urine (POC) Negative NEG     LACTIC ACID    Collection Time: 05/03/20  6:58 PM Result Value Ref Range    Lactic acid 1.2 0.4 - 2.0 MMOL/L   WET PREP    Collection Time: 05/03/20  8:01 PM   Result Value Ref Range    Special Requests: NO SPECIAL REQUESTS      Wet prep NO YEAST,TRICHOMONAS OR CLUE CELLS NOTED      Wet prep 5 TO 10 WBC PER HPF    CULTURE, BLOOD    Collection Time: 05/03/20  9:58 PM   Result Value Ref Range    Special Requests: RIGHT  Antecubital        GRAM STAIN GRAM NEGATIVE RODS      GRAM STAIN AEROBIC AND ANAEROBIC BOTTLES      GRAM STAIN        RESULTS VERIFIED, PHONED TO AND READ BACK BY DR FLOWERS @ 6642 ON 5/04/2020 AK. Culture result: CULTURE IN PROGRESS,FURTHER UPDATES TO FOLLOW      Culture result: REFER TO BIOFIRE PANEL ACC [id number]    BLOOD CULTURE ID PANEL    Collection Time: 05/03/20  9:58 PM   Result Value Ref Range    Acc. no. from Micro Order [id number]     Escherichia coli Detected (A) NOTDET      KPC (Carbapenem Resistance Gene) NOT DETECTED NOTDET      INTERPRETATION        Gram negative chandra. Identified by realtime PCR as E. coli   CBC WITH AUTOMATED DIFF    Collection Time: 05/04/20 12:39 PM   Result Value Ref Range    WBC 27.1 (H) 4.3 - 11.1 K/uL    RBC 4.46 4.05 - 5.2 M/uL    HGB 12.6 11.7 - 15.4 g/dL    HCT 38.6 35.8 - 46.3 %    MCV 86.5 79.6 - 97.8 FL    MCH 28.3 26.1 - 32.9 PG    MCHC 32.6 31.4 - 35.0 g/dL    RDW 13.4 11.9 - 14.6 %    PLATELET 609 669 - 188 K/uL    MPV 11.0 9.4 - 12.3 FL    ABSOLUTE NRBC 0.00 0.0 - 0.2 K/uL    DF AUTOMATED      NEUTROPHILS 86 (H) 43 - 78 %    LYMPHOCYTES 3 (L) 13 - 44 %    MONOCYTES 10 4.0 - 12.0 %    EOSINOPHILS 0 (L) 0.5 - 7.8 %    BASOPHILS 0 0.0 - 2.0 %    IMMATURE GRANULOCYTES 1 0.0 - 5.0 %    ABS. NEUTROPHILS 23.2 (H) 1.7 - 8.2 K/UL    ABS. LYMPHOCYTES 0.9 0.5 - 4.6 K/UL    ABS. MONOCYTES 2.7 (H) 0.1 - 1.3 K/UL    ABS. EOSINOPHILS 0.0 0.0 - 0.8 K/UL    ABS. BASOPHILS 0.1 0.0 - 0.2 K/UL    ABS. IMM.  GRANS. 0.2 0.0 - 0.5 K/UL   METABOLIC PANEL, COMPREHENSIVE    Collection Time: 05/04/20 12:39 PM   Result Value Ref Range    Sodium 134 (L) 136 - 145 mmol/L    Potassium 3.0 (L) 3.5 - 5.1 mmol/L    Chloride 98 98 - 107 mmol/L    CO2 25 21 - 32 mmol/L    Anion gap 11 7 - 16 mmol/L    Glucose 110 (H) 65 - 100 mg/dL    BUN 9 6 - 23 MG/DL    Creatinine 0.86 0.6 - 1.0 MG/DL    GFR est AA >60 >60 ml/min/1.73m2    GFR est non-AA >60 >60 ml/min/1.73m2    Calcium 9.1 8.3 - 10.4 MG/DL    Bilirubin, total 2.0 (H) 0.2 - 1.1 MG/DL    ALT (SGPT) 23 12 - 65 U/L    AST (SGOT) 19 15 - 37 U/L    Alk.  phosphatase 100 50 - 136 U/L    Protein, total 8.3 (H) 6.3 - 8.2 g/dL    Albumin 3.5 3.5 - 5.0 g/dL    Globulin 4.8 (H) 2.3 - 3.5 g/dL    A-G Ratio 0.7 (L) 1.2 - 3.5     LACTIC ACID    Collection Time: 05/04/20 12:39 PM   Result Value Ref Range    Lactic acid 1.6 0.4 - 2.0 MMOL/L   CULTURE, BLOOD    Collection Time: 05/04/20 12:39 PM   Result Value Ref Range    Special Requests: LEFT  Antecubital        Culture result: PENDING    URINALYSIS W/ RFLX MICROSCOPIC    Collection Time: 05/04/20  1:03 PM   Result Value Ref Range    Color CHRIS      Appearance CLOUDY      Specific gravity 1.016 1.001 - 1.023      pH (UA) 6.5 5.0 - 9.0      Protein 100 (A) NEG mg/dL    Glucose 100 mg/dL    Ketone Negative NEG mg/dL    Bilirubin SMALL (A) NEG      Blood LARGE (A) NEG      Urobilinogen 2.0 (H) 0.2 - 1.0 EU/dL    Nitrites Negative NEG      Leukocyte Esterase SMALL (A) NEG      WBC 5-10 0 /hpf    RBC 5-10 0 /hpf    Epithelial cells 5-10 0 /hpf    Bacteria 0 0 /hpf       All Micro Results     Procedure Component Value Units Date/Time    CULTURE, BLOOD [[id number]] Collected:  05/04/20 1340    Order Status:  Completed Specimen:  Blood Updated:  05/04/20 1344    CULTURE, BLOOD [[id number]] Collected:  05/04/20 1239    Order Status:  Completed Specimen:  Blood Updated:  05/04/20 1319     Special Requests: --        LEFT  Antecubital       Culture result: PENDING    CULTURE, BLOOD [[id number]]     Order Status:  Canceled Specimen:  Blood           Current Facility-Administered Medications   Medication Dose Route Frequency    sodium chloride (NS) flush 5-40 mL  5-40 mL IntraVENous Q8H    sodium chloride (NS) flush 5-40 mL  5-40 mL IntraVENous PRN    acetaminophen (TYLENOL) tablet 650 mg  650 mg Oral Q4H PRN    naloxone (NARCAN) injection 0.4 mg  0.4 mg IntraVENous PRN    diphenhydrAMINE (BENADRYL) capsule 25 mg  25 mg Oral Q4H PRN    ondansetron (ZOFRAN) injection 4 mg  4 mg IntraVENous Q4H PRN    senna-docusate (PERICOLACE) 8.6-50 mg per tablet 2 Tab  2 Tab Oral DAILY PRN    LORazepam (ATIVAN) tablet 0.5 mg  0.5 mg Oral Q4H PRN    [START ON 5/5/2020] cefTRIAXone (ROCEPHIN) 1 g in 0.9% sodium chloride (MBP/ADV) 50 mL  1 g IntraVENous Q24H    0.9% sodium chloride infusion  100 mL/hr IntraVENous CONTINUOUS    potassium chloride (K-DUR, KLOR-CON) SR tablet 40 mEq  40 mEq Oral NOW       Other Studies:  Ct Abd Pelv Wo Cont    Result Date: 5/3/2020  CT ABDOMEN AND PELVIS WITHOUT CONTRAST HISTORY: Nausea, vomiting, diarrhea and fever. COMPARISON: None TECHNIQUE: Helical imaging was performed from the lung bases through the ischial tuberosities without intravenous contrast. Oral contrast was not administered. Coronal and sagittal reformats were performed. Dose reduction techniques used: Automated exposure control, adjustment of the mAs and/or kVp according to patient's size, and iterative reconstruction techniques. FINDINGS: *  LUNG BASES: Within normal limits. *  LIVER: Within normal limits. *  GALLBLADDER AND BILE DUCTS: Normal. *  SPLEEN: Within normal limits. *  URINARY TRACT: Within normal limits. *  ADRENALS: Within normal limits. *  PANCREAS: Within normal limits. *  GASTROINTESTINAL TRACT: Within normal limits. *  RETROPERITONEUM: Within normal limits. *  PERITONEAL CAVITY AND ABDOMINAL WALL: Within normal limits. *  PELVIS: IUD within the endometrial canal. 3.1 x 2.7 cm right ovarian cyst. Small amount of cul-de-sac fluid.  *  SPINE / BONES: Within normal limits. *  OTHER COMMENTS: None. IMPRESSION: Right ovarian cyst with a small amount of cul-de-sac fluid. IUD within the endometrial canal.  Evaluation of the abdominal viscera is limited without intravenous contrast. Date of Dictation: 5/3/2020 7:55 PM     [id number]    Result Date: 5/3/2020  Right upper quadrant abdominal ultrasound History: gallbladder-left lower quadrant abd pain for 3 days, fever, elevated WBC, 19 years Female nausea vomiting diarrhea Comparison: None available. Findings: The liver is normal in echotexture and there is no dilatation of the intrahepatic ducts or evidence of focal mass. The liver measures 16.4 cm in midclavicular length. Normal hepatopedal flow within the main portal vein. The gallbladder is normal-appearing. No evidence of cholelithiasis, gallbladder wall thickening, or pericholecystic fluid. Negative sonographic [place] sign. The proximal common duct is not dilated and measures 4 mm. The right kidney is normal appearing and measures 9.2 cm in length. Partially visualized head and body of the pancreas appear unremarkable. The visualized portions of the IVC and aorta are unremarkable. No ascites or right pleural effusion. Impression: Normal study. Us Pelv Non Obs    Result Date: 5/3/2020  Examination:  Transabdominal ultrasound for evaluation of the pelvic contents. History: rule out torsion, 19 years Female; Patient's last menstrual period was unknown. Negative pregnancy test.  Patient has IUD. Presents with frequency, irregular menses, left lower quadrant pain for 3 days. Patient declined transvaginal imaging. Comparison: None available Findings: Uterus measures approximately 8.2 x 3.8 x 6.6 cm. Myometrium has a background normal uniform echotexture. Endometrial stripe measures approximately 0.4 cm. An intrauterine device appears in relative anatomic position within the endometrial cavity. The right and left ovary were well-visualized.  Right ovary measures approximately 5.2 x 3.5 x 2.9 cm, and appears to contain a simple appearing cyst measuring 2.4 x 3.7 x 2.9 cm, without flow, likely functional. Left ovary measures approximately 2.8 x 1.9 x 2.0 cm. Normal Doppler flow in bilateral ovaries. Trace simple free fluid was seen within the cul-de-sac, likely physiologic. Impression:  No acute pathology identified. Intrauterine device appears in relative anatomic position. Probable right ovarian functional cyst.        Assessment and Plan: Active Hospital Problems    Diagnosis Date Noted    Bacteremia due to Gram-negative bacteria 05/04/2020    Pyelonephritis 05/04/2020    Hypokalemia 05/04/2020       Plan:    Pyelonephritis  Bacteremia due to GNRs  - CT A/P showed right ovarian cyst with a small amount of cul-de-sac fluid. Transabdominal ultrasound of pelvic area showed no acute pathology. Ultrasound abdomen showed no acute pathology. - UA on 5/3 showed > 100 WBCs but today's UA showed slight leukocyte esterase.   - BCx ( 5/3) showed GNRs   - c/w recephin (5/4)  - IVF     Hypokalemia: check Mg; replete K and monitor    Diet:  DIET REGULAR  DVT PPx: SCDs  Code: Full Code  Estimated LOS:  Greater than 2 midnights      Medical Decision Making:    Labs/Imaging reviewed. Additional information obtained from ED provider. Patient is high risk due to medical condition and comorbidities. Plan discussed with nursing staff. Plan discussed with patient/family. All questions/concerns were addressed. Pt/family agrees with the plan.        Signed:  Ileana Avila MD

## 2020-05-04 NOTE — DISCHARGE INSTRUCTIONS
Use the nausea medication as needed. Complete the course of antibiotics as prescribed. Please return to the emergency department for any new or worsening symptoms.

## 2020-05-04 NOTE — PROGRESS NOTES
Report taken by dr gore. I attempted to call pt at phone number listed and this is not a working number. I called her emergency family member phone number and the message \"voice mail box not set up yet\" noted. Will continue to try to call her today.

## 2020-05-04 NOTE — ED TRIAGE NOTES
Pt reports that she was supposed to be admitted yesterday for a kidney infection but was unable to stay due to childcare arrangement. Reports continued fevers. Denies dysuria. Pt masked in triage.

## 2020-05-05 LAB
ANION GAP SERPL CALC-SCNC: 8 MMOL/L (ref 7–16)
BASOPHILS # BLD: 0.1 K/UL (ref 0–0.2)
BASOPHILS NFR BLD: 0 % (ref 0–2)
BUN SERPL-MCNC: 7 MG/DL (ref 6–23)
CALCIUM SERPL-MCNC: 8.7 MG/DL (ref 8.3–10.4)
CHLORIDE SERPL-SCNC: 105 MMOL/L (ref 98–107)
CO2 SERPL-SCNC: 23 MMOL/L (ref 21–32)
CREAT SERPL-MCNC: 0.69 MG/DL (ref 0.6–1)
DIFFERENTIAL METHOD BLD: ABNORMAL
EOSINOPHIL # BLD: 0 K/UL (ref 0–0.8)
EOSINOPHIL NFR BLD: 0 % (ref 0.5–7.8)
ERYTHROCYTE [DISTWIDTH] IN BLOOD BY AUTOMATED COUNT: 13.6 % (ref 11.9–14.6)
GLUCOSE SERPL-MCNC: 99 MG/DL (ref 65–100)
HCT VFR BLD AUTO: 35.2 % (ref 35.8–46.3)
HGB BLD-MCNC: 11.7 G/DL (ref 11.7–15.4)
IMM GRANULOCYTES # BLD AUTO: 0.2 K/UL (ref 0–0.5)
IMM GRANULOCYTES NFR BLD AUTO: 1 % (ref 0–5)
LYMPHOCYTES # BLD: 1.6 K/UL (ref 0.5–4.6)
LYMPHOCYTES NFR BLD: 9 % (ref 13–44)
MCH RBC QN AUTO: 28.7 PG (ref 26.1–32.9)
MCHC RBC AUTO-ENTMCNC: 33.2 G/DL (ref 31.4–35)
MCV RBC AUTO: 86.3 FL (ref 79.6–97.8)
MONOCYTES # BLD: 2.4 K/UL (ref 0.1–1.3)
MONOCYTES NFR BLD: 13 % (ref 4–12)
NEUTS SEG # BLD: 14.6 K/UL (ref 1.7–8.2)
NEUTS SEG NFR BLD: 78 % (ref 43–78)
NRBC # BLD: 0 K/UL (ref 0–0.2)
PLATELET # BLD AUTO: 219 K/UL (ref 150–450)
PMV BLD AUTO: 11.2 FL (ref 9.4–12.3)
POTASSIUM SERPL-SCNC: 3.3 MMOL/L (ref 3.5–5.1)
RBC # BLD AUTO: 4.08 M/UL (ref 4.05–5.2)
SODIUM SERPL-SCNC: 136 MMOL/L (ref 136–145)
WBC # BLD AUTO: 18.8 K/UL (ref 4.3–11.1)

## 2020-05-05 PROCEDURE — 74011250637 HC RX REV CODE- 250/637: Performed by: INTERNAL MEDICINE

## 2020-05-05 PROCEDURE — 80048 BASIC METABOLIC PNL TOTAL CA: CPT

## 2020-05-05 PROCEDURE — 74011250636 HC RX REV CODE- 250/636: Performed by: INTERNAL MEDICINE

## 2020-05-05 PROCEDURE — 65270000029 HC RM PRIVATE

## 2020-05-05 PROCEDURE — 87040 BLOOD CULTURE FOR BACTERIA: CPT

## 2020-05-05 PROCEDURE — 85025 COMPLETE CBC W/AUTO DIFF WBC: CPT

## 2020-05-05 PROCEDURE — 36415 COLL VENOUS BLD VENIPUNCTURE: CPT

## 2020-05-05 PROCEDURE — 74011000258 HC RX REV CODE- 258: Performed by: INTERNAL MEDICINE

## 2020-05-05 PROCEDURE — 74011000250 HC RX REV CODE- 250: Performed by: INTERNAL MEDICINE

## 2020-05-05 RX ORDER — ENOXAPARIN SODIUM 100 MG/ML
40 INJECTION SUBCUTANEOUS EVERY 24 HOURS
Status: DISCONTINUED | OUTPATIENT
Start: 2020-05-05 | End: 2020-05-07 | Stop reason: HOSPADM

## 2020-05-05 RX ADMIN — ONDANSETRON 4 MG: 2 INJECTION INTRAMUSCULAR; INTRAVENOUS at 04:47

## 2020-05-05 RX ADMIN — ACETAMINOPHEN 650 MG: 325 TABLET, FILM COATED ORAL at 23:41

## 2020-05-05 RX ADMIN — CEFTRIAXONE 1 G: 1 INJECTION, POWDER, FOR SOLUTION INTRAMUSCULAR; INTRAVENOUS at 13:34

## 2020-05-05 RX ADMIN — Medication 5 ML: at 04:47

## 2020-05-05 RX ADMIN — PROMETHAZINE HYDROCHLORIDE 6.25 MG: 25 INJECTION INTRAMUSCULAR; INTRAVENOUS at 14:41

## 2020-05-05 RX ADMIN — ONDANSETRON 4 MG: 2 INJECTION INTRAMUSCULAR; INTRAVENOUS at 13:44

## 2020-05-05 RX ADMIN — ONDANSETRON 4 MG: 2 INJECTION INTRAMUSCULAR; INTRAVENOUS at 00:57

## 2020-05-05 RX ADMIN — ENOXAPARIN SODIUM 40 MG: 40 INJECTION SUBCUTANEOUS at 17:56

## 2020-05-05 RX ADMIN — SODIUM CHLORIDE 100 ML/HR: 9 INJECTION, SOLUTION INTRAVENOUS at 04:47

## 2020-05-05 RX ADMIN — Medication 10 ML: at 20:08

## 2020-05-05 RX ADMIN — ACETAMINOPHEN 650 MG: 325 TABLET, FILM COATED ORAL at 13:34

## 2020-05-05 NOTE — PROGRESS NOTES
Initial visit by  to convey care and concern and encourage patient that  services are available if desired. Ms. Ramon Basurto is anxious to reunite with her son and she requested prayer during the visit. I provided prayer and emotional support. Chaplains remain available for support.      Mahendra Marmolejo 67  Board Certified

## 2020-05-05 NOTE — PROGRESS NOTES
Hospitalist Progress Note     Admit Date:  2020 12:44 PM   Name:  Alex Moore   Age:  20 y.o.  :  2000   MRN:  [id number]   PCP:  None  Treatment Team: Attending Provider: Shikha Ventura MD; Charge Nurse: Ari Mina Primary Nurse: Ilene Keller, RN; Care Manager: Gato Ponce, JESENIA; Utilization Review: Nidia Kaur        Subjective: May 5, 2020new patient for me today admitted overnight with gram-negative chandra bacteremia and acute pyelonephritis. Patient notes persistent nausea otherwise no new complaints. Objective:     Patient Vitals for the past 24 hrs:   Temp Pulse Resp BP SpO2   20 1551 100.1 °F (37.8 °C) 85 16 105/63 95 %   20 1109 99.5 °F (37.5 °C) 87 18 137/86 96 %   20 0815 99.7 °F (37.6 °C) 85 17 116/71 97 %   20 0344 99.9 °F (37.7 °C) (!) 103 17 114/75 93 %   20 2308 99.5 °F (37.5 °C) (!) 109 17 104/65 93 %   20 1921 99.4 °F (37.4 °C) (!) 110 18 102/63 96 %     Oxygen Therapy  O2 Sat (%): 95 % (20 1551)  Pulse via Oximetry: 116 beats per minute (20 1300)  No intake or output data in the 24 hours ending 20 1605      General:    Well nourished. Alert. CV:   RRR. No murmur, rub, or gallop. Lungs:   CTAB. No wheezing, rhonchi, or rales. Abdomen:   Soft, nontender, nondistended. Extremities: Warm and dry. No cyanosis or edema. Skin:     No rashes or jaundice. Data Review:  I have reviewed all labs, meds, telemetry events, and studies from the last 24 hours.     Recent Results (from the past 24 hour(s))   METABOLIC PANEL, BASIC    Collection Time: 20  4:58 AM   Result Value Ref Range    Sodium 136 136 - 145 mmol/L    Potassium 3.3 (L) 3.5 - 5.1 mmol/L    Chloride 105 98 - 107 mmol/L    CO2 23 21 - 32 mmol/L    Anion gap 8 7 - 16 mmol/L    Glucose 99 65 - 100 mg/dL    BUN 7 6 - 23 MG/DL    Creatinine 0.69 0.6 - 1.0 MG/DL    GFR est AA >60 >60 ml/min/1.73m2    GFR est non-AA >60 >60 ml/min/1.73m2    Calcium 8.7 8.3 - 10.4 MG/DL   CBC WITH AUTOMATED DIFF    Collection Time: 05/05/20  4:58 AM   Result Value Ref Range    WBC 18.8 (H) 4.3 - 11.1 K/uL    RBC 4.08 4.05 - 5.2 M/uL    HGB 11.7 11.7 - 15.4 g/dL    HCT 35.2 (L) 35.8 - 46.3 %    MCV 86.3 79.6 - 97.8 FL    MCH 28.7 26.1 - 32.9 PG    MCHC 33.2 31.4 - 35.0 g/dL    RDW 13.6 11.9 - 14.6 %    PLATELET 787 510 - 291 K/uL    MPV 11.2 9.4 - 12.3 FL    ABSOLUTE NRBC 0.00 0.0 - 0.2 K/uL    DF AUTOMATED      NEUTROPHILS 78 43 - 78 %    LYMPHOCYTES 9 (L) 13 - 44 %    MONOCYTES 13 (H) 4.0 - 12.0 %    EOSINOPHILS 0 (L) 0.5 - 7.8 %    BASOPHILS 0 0.0 - 2.0 %    IMMATURE GRANULOCYTES 1 0.0 - 5.0 %    ABS. NEUTROPHILS 14.6 (H) 1.7 - 8.2 K/UL    ABS. LYMPHOCYTES 1.6 0.5 - 4.6 K/UL    ABS. MONOCYTES 2.4 (H) 0.1 - 1.3 K/UL    ABS. EOSINOPHILS 0.0 0.0 - 0.8 K/UL    ABS. BASOPHILS 0.1 0.0 - 0.2 K/UL    ABS. IMM.  GRANS. 0.2 0.0 - 0.5 K/UL        All Micro Results     Procedure Component Value Units Date/Time    CULTURE, BLOOD [[id number]] Collected:  05/05/20 1203    Order Status:  Completed Specimen:  Blood Updated:  05/05/20 1235    CULTURE, BLOOD [[id number]] Collected:  05/04/20 1239    Order Status:  Completed Specimen:  Blood Updated:  05/05/20 0819     Special Requests: --        LEFT  Antecubital       Culture result: NO GROWTH AFTER 19 HOURS       CULTURE, BLOOD [[id number]] Collected:  05/04/20 1340    Order Status:  Canceled Specimen:  Blood     CULTURE, BLOOD [[id number]]     Order Status:  Canceled Specimen:  Blood           Current Meds:  Current Facility-Administered Medications   Medication Dose Route Frequency    promethazine (PHENERGAN) with saline injection 6.25 mg  6.25 mg IntraVENous Q6H PRN    sodium chloride (NS) flush 5-40 mL  5-40 mL IntraVENous Q8H    sodium chloride (NS) flush 5-40 mL  5-40 mL IntraVENous PRN    acetaminophen (TYLENOL) tablet 650 mg  650 mg Oral Q4H PRN    naloxone (NARCAN) injection 0.4 mg  0.4 mg IntraVENous PRN    diphenhydrAMINE (BENADRYL) capsule 25 mg  25 mg Oral Q4H PRN    ondansetron (ZOFRAN) injection 4 mg  4 mg IntraVENous Q4H PRN    senna-docusate (PERICOLACE) 8.6-50 mg per tablet 2 Tab  2 Tab Oral DAILY PRN    LORazepam (ATIVAN) tablet 0.5 mg  0.5 mg Oral Q4H PRN    cefTRIAXone (ROCEPHIN) 1 g in 0.9% sodium chloride (MBP/ADV) 50 mL  1 g IntraVENous Q24H    0.9% sodium chloride infusion  100 mL/hr IntraVENous CONTINUOUS       Other Studies (last 24 hours):  No results found.     Assessment and Plan:     Hospital Problems as of 5/5/2020 Date Reviewed: 5/4/2020          Codes Class Noted - Resolved POA    Bacteremia due to Gram-negative bacteria ICD-10-CM: R78.81  ICD-9-CM: 790.7, 041.85  5/4/2020 - Present Yes        * (Principal) Pyelonephritis ICD-10-CM: N12  ICD-9-CM: 590.80  5/4/2020 - Present Yes        Hypokalemia ICD-10-CM: E87.6  ICD-9-CM: 276.8  5/4/2020 - Present Yes              PLAN:    · GNR BACTEREMIA-continue current IV antibiotics, follow-up cultures for ID and sensitivity  · Pyelonephritis continue antibiotics and follow-up cultures for identification and sensitivity  · Hypokalemiareplete  · Abdominal painPRN pain medicines as needed  · Nausea and vomitingPRN antiemetics as needed; will add Phenergan as the patient states she is not getting any relief with Zofran    DC planning/Dispo: Home in the next 3 to 4 days pending further identification and sensitivity of her cultures and medical stability  DVT ppx: Lovenox    Signed:  Giovanni Jacobson MD

## 2020-05-05 NOTE — PROGRESS NOTES
Met with pt at bedside, maintain social distancing and appropriate PPE, pt found laying in bed alert and oriented x3, states she and boyfriend live together in 1 level home, states she is independent with ADLs and drives, not employed. PCP, insurance and demographics confirmed. No anticipated dc needs at this time. CM will remain available.     Care Management Interventions  PCP Verified by CM: Allyson Nielson MD)  Current Support Network: Lives with Spouse, Own Home  Confirm Follow Up Transport: Family  Discharge Location  Discharge Placement: Home

## 2020-05-05 NOTE — PROGRESS NOTES
Problem: Falls - Risk of  Goal: *Absence of Falls  Description: Document Clydene Bone Fall Risk and appropriate interventions in the flowsheet.   Outcome: Progressing Towards Goal  Note: Fall Risk Interventions:            Medication Interventions: Bed/chair exit alarm, Patient to call before getting OOB, Teach patient to arise slowly                   Problem: Patient Education: Go to Patient Education Activity  Goal: Patient/Family Education  Outcome: Progressing Towards Goal     Problem: Pain  Goal: *Control of Pain  Outcome: Progressing Towards Goal     Problem: Patient Education: Go to Patient Education Activity  Goal: Patient/Family Education  Outcome: Progressing Towards Goal     Problem: General Medical Care Plan  Goal: *Vital signs within specified parameters  Outcome: Progressing Towards Goal  Goal: *Labs within defined limits  Outcome: Progressing Towards Goal  Goal: *Absence of infection signs and symptoms  Outcome: Progressing Towards Goal  Goal: *Optimal pain control at patient's stated goal  Outcome: Progressing Towards Goal  Goal: *Skin integrity maintained  Outcome: Progressing Towards Goal  Goal: *Fluid volume balance  Outcome: Progressing Towards Goal  Goal: *Optimize nutritional status  Outcome: Progressing Towards Goal  Goal: *Anxiety reduced or absent  Outcome: Progressing Towards Goal  Goal: *Progressive mobility and function (eg: ADL's)  Outcome: Progressing Towards Goal     Problem: Patient Education: Go to Patient Education Activity  Goal: Patient/Family Education  Outcome: Progressing Towards Goal     Problem: Nausea/Vomiting (Adult)  Goal: *Absence of nausea/vomiting  Outcome: Progressing Towards Goal     Problem: Patient Education: Go to Patient Education Activity  Goal: Patient/Family Education  Outcome: Progressing Towards Goal

## 2020-05-05 NOTE — ROUTINE PROCESS
Frankey Railing from [facility] called and stated that only one Blood culture was drawn for this patient and they needed anther. Order placed for 1 time blood culture draw.

## 2020-05-05 NOTE — PROGRESS NOTES
Problem: Falls - Risk of  Goal: *Absence of Falls  Description: Document Rima Barnes Fall Risk and appropriate interventions in the flowsheet.   Outcome: Progressing Towards Goal  Note: Fall Risk Interventions:            Medication Interventions: Patient to call before getting OOB, Teach patient to arise slowly                   Problem: Patient Education: Go to Patient Education Activity  Goal: Patient/Family Education  Outcome: Progressing Towards Goal     Problem: Pain  Goal: *Control of Pain  Outcome: Progressing Towards Goal     Problem: Patient Education: Go to Patient Education Activity  Goal: Patient/Family Education  Outcome: Progressing Towards Goal     Problem: General Medical Care Plan  Goal: *Vital signs within specified parameters  Outcome: Progressing Towards Goal  Goal: *Labs within defined limits  Outcome: Progressing Towards Goal  Goal: *Absence of infection signs and symptoms  Outcome: Progressing Towards Goal  Goal: *Optimal pain control at patient's stated goal  Outcome: Progressing Towards Goal  Goal: *Skin integrity maintained  Outcome: Progressing Towards Goal  Goal: *Fluid volume balance  Outcome: Progressing Towards Goal  Goal: *Optimize nutritional status  Outcome: Progressing Towards Goal  Goal: *Anxiety reduced or absent  Outcome: Progressing Towards Goal  Goal: *Progressive mobility and function (eg: ADL's)  Outcome: Progressing Towards Goal     Problem: Patient Education: Go to Patient Education Activity  Goal: Patient/Family Education  Outcome: Progressing Towards Goal

## 2020-05-06 LAB
ANION GAP SERPL CALC-SCNC: 5 MMOL/L (ref 7–16)
BACTERIA SPEC CULT: ABNORMAL
BASOPHILS # BLD: 0.1 K/UL (ref 0–0.2)
BASOPHILS NFR BLD: 0 % (ref 0–2)
BUN SERPL-MCNC: 7 MG/DL (ref 6–23)
C TRACH RRNA SPEC QL NAA+PROBE: POSITIVE
CALCIUM SERPL-MCNC: 8.4 MG/DL (ref 8.3–10.4)
CHLORIDE SERPL-SCNC: 108 MMOL/L (ref 98–107)
CO2 SERPL-SCNC: 27 MMOL/L (ref 21–32)
CREAT SERPL-MCNC: 0.54 MG/DL (ref 0.6–1)
DIFFERENTIAL METHOD BLD: ABNORMAL
EOSINOPHIL # BLD: 0.1 K/UL (ref 0–0.8)
EOSINOPHIL NFR BLD: 0 % (ref 0.5–7.8)
ERYTHROCYTE [DISTWIDTH] IN BLOOD BY AUTOMATED COUNT: 13.6 % (ref 11.9–14.6)
GLUCOSE SERPL-MCNC: 87 MG/DL (ref 65–100)
GRAM STN SPEC: ABNORMAL
HCT VFR BLD AUTO: 32.9 % (ref 35.8–46.3)
HGB BLD-MCNC: 10.9 G/DL (ref 11.7–15.4)
IMM GRANULOCYTES # BLD AUTO: 0.2 K/UL (ref 0–0.5)
IMM GRANULOCYTES NFR BLD AUTO: 1 % (ref 0–5)
LYMPHOCYTES # BLD: 2.5 K/UL (ref 0.5–4.6)
LYMPHOCYTES NFR BLD: 18 % (ref 13–44)
MCH RBC QN AUTO: 28.5 PG (ref 26.1–32.9)
MCHC RBC AUTO-ENTMCNC: 33.1 G/DL (ref 31.4–35)
MCV RBC AUTO: 85.9 FL (ref 79.6–97.8)
MONOCYTES # BLD: 1.2 K/UL (ref 0.1–1.3)
MONOCYTES NFR BLD: 9 % (ref 4–12)
N GONORRHOEA RRNA SPEC QL NAA+PROBE: POSITIVE
NEUTS SEG # BLD: 9.8 K/UL (ref 1.7–8.2)
NEUTS SEG NFR BLD: 72 % (ref 43–78)
NRBC # BLD: 0 K/UL (ref 0–0.2)
PLATELET # BLD AUTO: 249 K/UL (ref 150–450)
PMV BLD AUTO: 11.1 FL (ref 9.4–12.3)
POTASSIUM SERPL-SCNC: 3.3 MMOL/L (ref 3.5–5.1)
RBC # BLD AUTO: 3.83 M/UL (ref 4.05–5.2)
SERVICE CMNT-IMP: ABNORMAL
SERVICE CMNT-IMP: ABNORMAL
SODIUM SERPL-SCNC: 140 MMOL/L (ref 136–145)
SPECIMEN SOURCE: ABNORMAL
WBC # BLD AUTO: 13.7 K/UL (ref 4.3–11.1)

## 2020-05-06 PROCEDURE — 74011250637 HC RX REV CODE- 250/637: Performed by: INTERNAL MEDICINE

## 2020-05-06 PROCEDURE — 80048 BASIC METABOLIC PNL TOTAL CA: CPT

## 2020-05-06 PROCEDURE — 74011250636 HC RX REV CODE- 250/636: Performed by: INTERNAL MEDICINE

## 2020-05-06 PROCEDURE — 36415 COLL VENOUS BLD VENIPUNCTURE: CPT

## 2020-05-06 PROCEDURE — 74011000258 HC RX REV CODE- 258: Performed by: INTERNAL MEDICINE

## 2020-05-06 PROCEDURE — 94760 N-INVAS EAR/PLS OXIMETRY 1: CPT

## 2020-05-06 PROCEDURE — 65270000029 HC RM PRIVATE

## 2020-05-06 PROCEDURE — 85025 COMPLETE CBC W/AUTO DIFF WBC: CPT

## 2020-05-06 RX ORDER — POTASSIUM CHLORIDE 20 MEQ/1
40 TABLET, EXTENDED RELEASE ORAL 2 TIMES DAILY
Status: COMPLETED | OUTPATIENT
Start: 2020-05-06 | End: 2020-05-07

## 2020-05-06 RX ORDER — POTASSIUM CHLORIDE 20 MEQ/1
40 TABLET, EXTENDED RELEASE ORAL 2 TIMES DAILY
Status: DISCONTINUED | OUTPATIENT
Start: 2020-05-06 | End: 2020-05-06

## 2020-05-06 RX ADMIN — ENOXAPARIN SODIUM 40 MG: 40 INJECTION SUBCUTANEOUS at 17:00

## 2020-05-06 RX ADMIN — Medication 5 ML: at 13:31

## 2020-05-06 RX ADMIN — ACETAMINOPHEN 650 MG: 325 TABLET, FILM COATED ORAL at 19:40

## 2020-05-06 RX ADMIN — Medication 10 ML: at 19:41

## 2020-05-06 RX ADMIN — CEFTRIAXONE 1 G: 1 INJECTION, POWDER, FOR SOLUTION INTRAMUSCULAR; INTRAVENOUS at 13:29

## 2020-05-06 RX ADMIN — Medication 5 ML: at 05:01

## 2020-05-06 RX ADMIN — POTASSIUM CHLORIDE 40 MEQ: 20 TABLET, EXTENDED RELEASE ORAL at 13:29

## 2020-05-06 RX ADMIN — POTASSIUM CHLORIDE 40 MEQ: 20 TABLET, EXTENDED RELEASE ORAL at 17:35

## 2020-05-06 NOTE — PROGRESS NOTES
Problem: Falls - Risk of  Goal: *Absence of Falls  Description: Document Clydene Bone Fall Risk and appropriate interventions in the flowsheet.   5/6/2020 1003 by Bartolome Robertson RN  Outcome: Progressing Towards Goal  Note: Fall Risk Interventions:            Medication Interventions: Bed/chair exit alarm, Patient to call before getting OOB, Teach patient to arise slowly                5/6/2020 1003 by Bartolome Robertson RN  Outcome: Progressing Towards Goal  Note: Fall Risk Interventions:            Medication Interventions: Bed/chair exit alarm, Patient to call before getting OOB, Teach patient to arise slowly                   Problem: Patient Education: Go to Patient Education Activity  Goal: Patient/Family Education  5/6/2020 1003 by Bartolome Robertson RN  Outcome: Progressing Towards Goal  5/6/2020 1003 by Bartolome Robertson RN  Outcome: Progressing Towards Goal     Problem: Pain  Goal: *Control of Pain  5/6/2020 1003 by Bartolome Robertson RN  Outcome: Progressing Towards Goal  5/6/2020 1003 by Bartolome Robertson RN  Outcome: Progressing Towards Goal     Problem: Patient Education: Go to Patient Education Activity  Goal: Patient/Family Education  5/6/2020 1003 by Bartolome Robertson RN  Outcome: Progressing Towards Goal  5/6/2020 1003 by Bartolome Robertson RN  Outcome: Progressing Towards Goal     Problem: General Medical Care Plan  Goal: *Vital signs within specified parameters  5/6/2020 1003 by Bartolome Robertson RN  Outcome: Progressing Towards Goal  5/6/2020 1003 by Bartolome Robertson RN  Outcome: Progressing Towards Goal  Goal: *Labs within defined limits  5/6/2020 1003 by Bartolome Robertson RN  Outcome: Progressing Towards Goal  5/6/2020 1003 by Bartolome Robertson RN  Outcome: Progressing Towards Goal  Goal: *Absence of infection signs and symptoms  5/6/2020 1003 by Bartolome Robertson RN  Outcome: Progressing Towards Goal  5/6/2020 1003 by Bartolome Robertson RN  Outcome: Progressing Towards Goal  Goal: *Optimal pain control at patient's stated goal  5/6/2020 1003 by Ilene Keller RN  Outcome: Progressing Towards Goal  5/6/2020 1003 by Ilene Keller RN  Outcome: Progressing Towards Goal  Goal: *Skin integrity maintained  5/6/2020 1003 by Ilene Keller RN  Outcome: Progressing Towards Goal  5/6/2020 1003 by Ilene Keller RN  Outcome: Progressing Towards Goal  Goal: *Fluid volume balance  5/6/2020 1003 by Ilene Kelelr RN  Outcome: Progressing Towards Goal  5/6/2020 1003 by Ilene Keller RN  Outcome: Progressing Towards Goal  Goal: *Optimize nutritional status  5/6/2020 1003 by Ilene Keller RN  Outcome: Progressing Towards Goal  5/6/2020 1003 by Ilene Keller RN  Outcome: Progressing Towards Goal  Goal: *Anxiety reduced or absent  5/6/2020 1003 by Ilene Keller RN  Outcome: Progressing Towards Goal  5/6/2020 1003 by Ilene Keller RN  Outcome: Progressing Towards Goal  Goal: *Progressive mobility and function (eg: ADL's)  5/6/2020 1003 by Ilene Keller RN  Outcome: Progressing Towards Goal  5/6/2020 1003 by Ilene Keller RN  Outcome: Progressing Towards Goal     Problem: Patient Education: Go to Patient Education Activity  Goal: Patient/Family Education  5/6/2020 1003 by Ilene Keller RN  Outcome: Progressing Towards Goal  5/6/2020 1003 by Ilene Keller RN  Outcome: Progressing Towards Goal     Problem: Nausea/Vomiting (Adult)  Goal: *Absence of nausea/vomiting  5/6/2020 1003 by Ilene Keller RN  Outcome: Progressing Towards Goal  5/6/2020 1003 by Ilene Keller RN  Outcome: Progressing Towards Goal     Problem: Patient Education: Go to Patient Education Activity  Goal: Patient/Family Education  Outcome: Progressing Towards Goal

## 2020-05-06 NOTE — PROGRESS NOTES
Hospitalist Progress Note     Admit Date:  2020 12:44 PM   Name:  Ayo Pineda   Age:  20 y.o.  :  2000   MRN:  [id number]   PCP:  None  Treatment Team: Attending Provider: Gael Chinchilla MD; Utilization Review: Napoleon Ha; Primary Nurse: Leona Chao RN; Charge Nurse: Tyler Cuevas; Care Manager: Saw Gallego LMSW        Subjective: May 5, 2020new patient for me today admitted overnight with gram-negative chandra bacteremia and acute pyelonephritis. Patient notes persistent nausea otherwise no new complaints. May 6, 2020patient seen and evaluated. Her nausea has improved. Cultures are growing E. coli that is pansensitive. Repeat blood cultures are no growth to date. Objective:     Patient Vitals for the past 24 hrs:   Temp Pulse Resp BP SpO2   20 0817     98 %   20 0744 98.4 °F (36.9 °C) 81 18 146/80 98 %   20 0356 98.2 °F (36.8 °C) 68 16 108/71 98 %   20 0019 98 °F (36.7 °C) 77 16 121/88 99 %   20 1958 98.4 °F (36.9 °C) 87 20 105/70 98 %   20 1551 100.1 °F (37.8 °C) 85 16 105/63 95 %     Oxygen Therapy  O2 Sat (%): 98 % (20 0817)  Pulse via Oximetry: 68 beats per minute (20 0817)  O2 Device: Room air (20 08)  No intake or output data in the 24 hours ending 20 1137      General:    Well nourished. Alert. CV:   RRR. No murmur, rub, or gallop. Lungs:   CTAB. No wheezing, rhonchi, or rales. Abdomen:   Soft, nontender, nondistended. Extremities: Warm and dry. No cyanosis or edema. Skin:     No rashes or jaundice. Data Review:  I have reviewed all labs, meds, telemetry events, and studies from the last 24 hours.     Recent Results (from the past 24 hour(s))   CULTURE, BLOOD    Collection Time: 20 12:03 PM   Result Value Ref Range    Special Requests: RIGHT  FOREARM        Culture result: NO GROWTH AFTER 17 HOURS     METABOLIC PANEL, BASIC    Collection Time: 20  6:19 AM   Result Value Ref Range    Sodium 140 136 - 145 mmol/L    Potassium 3.3 (L) 3.5 - 5.1 mmol/L    Chloride 108 (H) 98 - 107 mmol/L    CO2 27 21 - 32 mmol/L    Anion gap 5 (L) 7 - 16 mmol/L    Glucose 87 65 - 100 mg/dL    BUN 7 6 - 23 MG/DL    Creatinine 0.54 (L) 0.6 - 1.0 MG/DL    GFR est AA >60 >60 ml/min/1.73m2    GFR est non-AA >60 >60 ml/min/1.73m2    Calcium 8.4 8.3 - 10.4 MG/DL   CBC WITH AUTOMATED DIFF    Collection Time: 05/06/20  6:19 AM   Result Value Ref Range    WBC 13.7 (H) 4.3 - 11.1 K/uL    RBC 3.83 (L) 4.05 - 5.2 M/uL    HGB 10.9 (L) 11.7 - 15.4 g/dL    HCT 32.9 (L) 35.8 - 46.3 %    MCV 85.9 79.6 - 97.8 FL    MCH 28.5 26.1 - 32.9 PG    MCHC 33.1 31.4 - 35.0 g/dL    RDW 13.6 11.9 - 14.6 %    PLATELET 357 783 - 531 K/uL    MPV 11.1 9.4 - 12.3 FL    ABSOLUTE NRBC 0.00 0.0 - 0.2 K/uL    DF AUTOMATED      NEUTROPHILS 72 43 - 78 %    LYMPHOCYTES 18 13 - 44 %    MONOCYTES 9 4.0 - 12.0 %    EOSINOPHILS 0 (L) 0.5 - 7.8 %    BASOPHILS 0 0.0 - 2.0 %    IMMATURE GRANULOCYTES 1 0.0 - 5.0 %    ABS. NEUTROPHILS 9.8 (H) 1.7 - 8.2 K/UL    ABS. LYMPHOCYTES 2.5 0.5 - 4.6 K/UL    ABS. MONOCYTES 1.2 0.1 - 1.3 K/UL    ABS. EOSINOPHILS 0.1 0.0 - 0.8 K/UL    ABS. BASOPHILS 0.1 0.0 - 0.2 K/UL    ABS. IMM.  GRANS. 0.2 0.0 - 0.5 K/UL        All Micro Results     Procedure Component Value Units Date/Time    CULTURE, BLOOD [[id number]] Collected:  05/05/20 1203    Order Status:  Completed Specimen:  Blood Updated:  05/06/20 0629     Special Requests: --        RIGHT  FOREARM       Culture result: NO GROWTH AFTER 17 HOURS       CULTURE, BLOOD [[id number]] Collected:  05/04/20 1239    Order Status:  Completed Specimen:  Blood Updated:  05/06/20 0629     Special Requests: --        LEFT  Antecubital       Culture result: NO GROWTH 2 DAYS       CULTURE, BLOOD [[id number]] Collected:  05/04/20 1340    Order Status:  Canceled Specimen:  Blood     CULTURE, BLOOD [[id number]]     Order Status:  Canceled Specimen:  Blood           Current Meds:  Current Facility-Administered Medications   Medication Dose Route Frequency    potassium chloride (K-DUR, KLOR-CON) SR tablet 40 mEq  40 mEq Oral BID    promethazine (PHENERGAN) with saline injection 6.25 mg  6.25 mg IntraVENous Q6H PRN    enoxaparin (LOVENOX) injection 40 mg  40 mg SubCUTAneous Q24H    sodium chloride (NS) flush 5-40 mL  5-40 mL IntraVENous Q8H    sodium chloride (NS) flush 5-40 mL  5-40 mL IntraVENous PRN    acetaminophen (TYLENOL) tablet 650 mg  650 mg Oral Q4H PRN    naloxone (NARCAN) injection 0.4 mg  0.4 mg IntraVENous PRN    diphenhydrAMINE (BENADRYL) capsule 25 mg  25 mg Oral Q4H PRN    ondansetron (ZOFRAN) injection 4 mg  4 mg IntraVENous Q4H PRN    senna-docusate (PERICOLACE) 8.6-50 mg per tablet 2 Tab  2 Tab Oral DAILY PRN    LORazepam (ATIVAN) tablet 0.5 mg  0.5 mg Oral Q4H PRN    cefTRIAXone (ROCEPHIN) 1 g in 0.9% sodium chloride (MBP/ADV) 50 mL  1 g IntraVENous Q24H       Other Studies (last 24 hours):  No results found.     Assessment and Plan:     Hospital Problems as of 5/6/2020 Date Reviewed: 5/4/2020          Codes Class Noted - Resolved POA    Bacteremia due to Gram-negative bacteria ICD-10-CM: R78.81  ICD-9-CM: 790.7, 041.85  5/4/2020 - Present Yes        * (Principal) Pyelonephritis ICD-10-CM: N12  ICD-9-CM: 590.80  5/4/2020 - Present Yes        Hypokalemia ICD-10-CM: E87.6  ICD-9-CM: 276.8  5/4/2020 - Present Yes              PLAN:    ·  E. coli BACTEREMIA-continue current IV antibiotics; if repeat cultures are negative patient be able to discharge home on p.o. medications  · Pyelonephritis continue antibiotics and follow-up cultures for identification and sensitivity  · Hypokalemiareplete  · Abdominal painPRN pain medicines as needed  · Nausea and vomitingPRN antiemetics as needed;     DC planning/Dispo: Home in the next 3 to 4 days pending further identification and sensitivity of her cultures and medical stability  DVT ppx: Lovenox    Signed:  Prosper Jonas Murphy MD

## 2020-05-06 NOTE — PROGRESS NOTES
Problem: Falls - Risk of  Goal: *Absence of Falls  Description: Document Lukas Serrano Fall Risk and appropriate interventions in the flowsheet.   Outcome: Progressing Towards Goal  Note: Fall Risk Interventions:            Medication Interventions: Bed/chair exit alarm, Evaluate medications/consider consulting pharmacy, Patient to call before getting OOB, Teach patient to arise slowly                   Problem: Patient Education: Go to Patient Education Activity  Goal: Patient/Family Education  Outcome: Progressing Towards Goal     Problem: Pain  Goal: *Control of Pain  Outcome: Progressing Towards Goal     Problem: Patient Education: Go to Patient Education Activity  Goal: Patient/Family Education  Outcome: Progressing Towards Goal     Problem: General Medical Care Plan  Goal: *Vital signs within specified parameters  Outcome: Progressing Towards Goal  Goal: *Labs within defined limits  Outcome: Progressing Towards Goal  Goal: *Absence of infection signs and symptoms  Outcome: Progressing Towards Goal  Goal: *Optimal pain control at patient's stated goal  Outcome: Progressing Towards Goal  Goal: *Skin integrity maintained  Outcome: Progressing Towards Goal  Goal: *Fluid volume balance  Outcome: Progressing Towards Goal  Goal: *Optimize nutritional status  Outcome: Progressing Towards Goal  Goal: *Anxiety reduced or absent  Outcome: Progressing Towards Goal  Goal: *Progressive mobility and function (eg: ADL's)  Outcome: Progressing Towards Goal     Problem: Patient Education: Go to Patient Education Activity  Goal: Patient/Family Education  Outcome: Progressing Towards Goal     Problem: Nausea/Vomiting (Adult)  Goal: *Absence of nausea/vomiting  Outcome: Progressing Towards Goal     Problem: Patient Education: Go to Patient Education Activity  Goal: Patient/Family Education  Outcome: Progressing Towards Goal

## 2020-05-07 VITALS
DIASTOLIC BLOOD PRESSURE: 78 MMHG | BODY MASS INDEX: 27.29 KG/M2 | TEMPERATURE: 97.4 F | HEART RATE: 61 BPM | WEIGHT: 130 LBS | HEIGHT: 58 IN | OXYGEN SATURATION: 97 % | SYSTOLIC BLOOD PRESSURE: 125 MMHG | RESPIRATION RATE: 20 BRPM

## 2020-05-07 PROCEDURE — 74011000258 HC RX REV CODE- 258: Performed by: INTERNAL MEDICINE

## 2020-05-07 PROCEDURE — 74011250636 HC RX REV CODE- 250/636: Performed by: INTERNAL MEDICINE

## 2020-05-07 PROCEDURE — 74011250637 HC RX REV CODE- 250/637: Performed by: INTERNAL MEDICINE

## 2020-05-07 RX ORDER — SAME BUTANEDISULFONATE/BETAINE 400-600 MG
250 POWDER IN PACKET (EA) ORAL 2 TIMES DAILY
Qty: 14 CAP | Refills: 0 | Status: SHIPPED | OUTPATIENT
Start: 2020-05-07 | End: 2020-05-14

## 2020-05-07 RX ORDER — OXYCODONE AND ACETAMINOPHEN 5; 325 MG/1; MG/1
1 TABLET ORAL
Qty: 9 TAB | Refills: 0 | Status: SHIPPED | OUTPATIENT
Start: 2020-05-07 | End: 2020-05-07

## 2020-05-07 RX ORDER — PROMETHAZINE HYDROCHLORIDE 12.5 MG/1
12.5 TABLET ORAL
Qty: 12 TAB | Refills: 0 | Status: SHIPPED | OUTPATIENT
Start: 2020-05-07

## 2020-05-07 RX ORDER — SULFAMETHOXAZOLE AND TRIMETHOPRIM 800; 160 MG/1; MG/1
1 TABLET ORAL 2 TIMES DAILY
Qty: 12 TAB | Refills: 0 | Status: SHIPPED | OUTPATIENT
Start: 2020-05-08 | End: 2020-05-14

## 2020-05-07 RX ADMIN — ONDANSETRON 4 MG: 2 INJECTION INTRAMUSCULAR; INTRAVENOUS at 09:00

## 2020-05-07 RX ADMIN — POTASSIUM CHLORIDE 40 MEQ: 20 TABLET, EXTENDED RELEASE ORAL at 08:06

## 2020-05-07 RX ADMIN — CEFTRIAXONE 1 G: 1 INJECTION, POWDER, FOR SOLUTION INTRAMUSCULAR; INTRAVENOUS at 11:50

## 2020-05-07 RX ADMIN — Medication 10 ML: at 06:06

## 2020-05-07 NOTE — DISCHARGE SUMMARY
Hospitalist Discharge Summary     Admit Date:  2020 12:44 PM   Name:  Chase Irizarry   Age:  20 y.o.  :  2000   MRN:  [id number]   PCP:  None  Treatment Team: Attending Provider: Samreen Yen MD; Utilization Review: Hernán Randhawa; Care Manager: Ame King LMSW; Charge Nurse: Shayna Goncalves    Problem List for this Hospitalization:  Hospital Problems as of 2020 Date Reviewed: 2020          Codes Class Noted - Resolved POA    Bacteremia due to Gram-negative bacteria ICD-10-CM: R78.81  ICD-9-CM: 790.7, 041.85  2020 - Present Yes        * (Principal) Pyelonephritis ICD-10-CM: N12  ICD-9-CM: 590.80  2020 - Present Yes        Hypokalemia ICD-10-CM: E87.6  ICD-9-CM: 276.8  2020 - Present Yes                Admission HPI from 2020:    \" Ms. Buster Shin is a 20year old female with no significant past medical history who presented to ED with left flank pain, fever, nausea/vomiting x 2-3 days. Patient was seen in the ED yesterday and was to be admitted for Pyelonephritis. She wasn't able to stay due to  issues and was discharged with PO antibiotics. Since she went home, she has nausea and vomiting overnight without being able keep anything down PO. She reports some slight cough, generalized body pains, fever, chills and sweats overnight. She takes ibuprofen for pain with some minor relief but unable to control her pain now as she isnt able to keep anything down. In the ED, patient had slight temp of 100.3F and was tachycardic. Labs were notable for  WBC of 27.1, potassium of 3.0, lactic acid of 1.6.     Today's UA showed small leukocyte esterase but dipstick from 5/3 showed >300 WBCs. Blood culture obtained on 5/3 showed GNRs.   \"    Hospital Course: The patient was admitted and continued on abx. Culture grew out pan sensitive e. Coli in the urine and in the blood. She is tolerating a diet and afebrile. Repeat cultures are negative.     Follow up instructions below. Plan was discussed with the patient and the IDT. All questions answered. Patient was stable at time of discharge and was instructed to call or return if there are any concerns or recurrence of symptoms. Diagnostic Imaging/Tests:   No results found. Echocardiogram results:  No results found for this visit on 05/04/20.       All Micro Results     Procedure Component Value Units Date/Time    CULTURE, BLOOD [[id number]] Collected:  05/05/20 1203    Order Status:  Completed Specimen:  Blood Updated:  05/07/20 0749     Special Requests: --        RIGHT  FOREARM       Culture result: NO GROWTH 2 DAYS       CULTURE, BLOOD [[id number]] Collected:  05/04/20 1239    Order Status:  Completed Specimen:  Blood Updated:  05/07/20 0749     Special Requests: --        LEFT  Antecubital       Culture result: NO GROWTH 3 DAYS       CULTURE, BLOOD [[id number]] Collected:  05/04/20 1340    Order Status:  Canceled Specimen:  Blood     CULTURE, BLOOD [[id number]]     Order Status:  Canceled Specimen:  Blood           Labs: Results:       BMP, Mg, Phos Recent Labs     05/06/20 0619 05/05/20 0458 05/04/20  1239 05/04/20  1237    136 134*  --    K 3.3* 3.3* 3.0*  --    * 105 98  --    CO2 27 23 25  --    AGAP 5* 8 11  --    BUN 7 7 9  --    CREA 0.54* 0.69 0.86  --    CA 8.4 8.7 9.1  --    GLU 87 99 110*  --    MG  --   --   --  2.0      CBC Recent Labs     05/06/20 0619 05/05/20 0458 05/04/20  1239   WBC 13.7* 18.8* 27.1*   RBC 3.83* 4.08 4.46   HGB 10.9* 11.7 12.6   HCT 32.9* 35.2* 38.6    219 268   GRANS 72 78 86*   LYMPH 18 9* 3*   EOS 0* 0* 0*   MONOS 9 13* 10   BASOS 0 0 0   IG 1 1 1   ANEU 9.8* 14.6* 23.2*   ABL 2.5 1.6 0.9   CARRIE 0.1 0.0 0.0   ABM 1.2 2.4* 2.7*   ABB 0.1 0.1 0.1   AIG 0.2 0.2 0.2      LFT Recent Labs     05/04/20  1239   SGOT 19   ALT 23      TP 8.3*   ALB 3.5   GLOB 4.8*   AGRAT 0.7*      Cardiac Testing No results found for: BNPP, BNP, CPK, RCK1, RCK2, RCK3, RCK4, CKMB, CKNDX, CKND1, TROPT, TROIQ   Coagulation Tests No results found for: PTP, INR, APTT, INREXT   A1c No results found for: HBA1C, HGBE8, QDR3QVEB   Lipid Panel No results found for: CHOL, CHOLPOCT, CHOLX, CHLST, CHOLV, 900140, HDL, HDLP, LDL, LDLC, DLDLP, 075173, VLDLC, VLDL, TGLX, TRIGL, TRIGP, TGLPOCT, CHHD, CHHDX   Thyroid Panel No results found for: T4, T3U, TSH, TSHEXT     Most Recent UA Lab Results   Component Value Date/Time    Color CHRIS 05/04/2020 01:03 PM    Appearance CLOUDY 05/04/2020 01:03 PM    Specific gravity 1.016 05/04/2020 01:03 PM    pH (UA) 6.5 05/04/2020 01:03 PM    Protein 100 (A) 05/04/2020 01:03 PM    Glucose 100 05/04/2020 01:03 PM    Ketone Negative 05/04/2020 01:03 PM    Bilirubin SMALL (A) 05/04/2020 01:03 PM    Blood LARGE (A) 05/04/2020 01:03 PM    Urobilinogen 2.0 (H) 05/04/2020 01:03 PM    Nitrites Negative 05/04/2020 01:03 PM    Leukocyte Esterase SMALL (A) 05/04/2020 01:03 PM        No Known Allergies    There is no immunization history on file for this patient. All Labs from Last 24 Hrs:  No results found for this or any previous visit (from the past 24 hour(s)). Discharge Exam:  Patient Vitals for the past 24 hrs:   Temp Pulse Resp BP SpO2   05/07/20 0743 98.3 °F (36.8 °C) 62 18 137/79 97 %   05/07/20 0427 98.3 °F (36.8 °C) (!) 58 16 125/83 98 %   05/06/20 2354 98.6 °F (37 °C) 66 20 114/71 97 %   05/06/20 2032 98.8 °F (37.1 °C) 79 18 101/42 98 %   05/06/20 1506 98.7 °F (37.1 °C) 76 20 126/83 93 %   05/06/20 1146 98.8 °F (37.1 °C) 92 16 96/65 97 %     Oxygen Therapy  O2 Sat (%): 97 % (05/07/20 0743)  Pulse via Oximetry: 68 beats per minute (05/06/20 0817)  O2 Device: Room air (05/06/20 0817)    Intake/Output Summary (Last 24 hours) at 5/7/2020 0842  Last data filed at 5/6/2020 1200  Gross per 24 hour   Intake 0 ml   Output    Net 0 ml       General:    Well nourished. Alert. No distress. Eyes:   Normal sclera. Extraocular movements intact.   ENT:  Normocephalic, atraumatic. Moist mucous membranes  CV:   Regular rate and rhythm. No murmur, rub, or gallop. Lungs:  Clear to auscultation bilaterally. No wheezing, rhonchi, or rales. Abdomen: Soft, nontender, nondistended. Bowel sounds normal.   Extremities: Warm and dry. No cyanosis or edema. Neurologic: CN II-XII grossly intact. Sensation intact. Skin:     No rashes or jaundice. Psych:  Normal mood and affect. Discharge Info:   Current Discharge Medication List      START taking these medications    Details   trimethoprim-sulfamethoxazole (BACTRIM DS, SEPTRA DS) 160-800 mg per tablet Take 1 Tab by mouth two (2) times a day for 6 days. Qty: 12 Tab, Refills: 0      promethazine (PHENERGAN) 12.5 mg tablet Take 1 Tab by mouth every eight (8) hours as needed for Nausea. Qty: 12 Tab, Refills: 0      Saccharomyces boulardii (Florastor) 250 mg capsule Take 1 Cap by mouth two (2) times a day for 7 days. Qty: 14 Cap, Refills: 0         STOP taking these medications       ondansetron (Zofran ODT) 4 mg disintegrating tablet Comments:   Reason for Stopping:         cephALEXin (Keflex) 500 mg capsule Comments:   Reason for Stopping:                 Disposition: home  Activity: as tolerated  Diet: DIET REGULAR    No orders of the defined types were placed in this encounter. Follow-up Information     Follow up With Specialties Details Why Contact Info    your pcp    in 1 - 2 weeks           Time spent in patient discharge planning and coordination 35 minutes.     Signed:  Nikki Ba MD

## 2020-05-07 NOTE — PROGRESS NOTES
Pt is medically cleared for discharge to home today. No dc needs or concerns identified or reported.     Care Management Interventions  PCP Verified by CM: Ander Bey MD)  Current Support Network: Lives with Spouse, Own Home  Confirm Follow Up Transport: Family  Discharge Location  Discharge Placement: Home

## 2020-05-07 NOTE — PROGRESS NOTES
Discharge instructions and prescriptions given and reviewed with pt, verbalizes understanding, pt to be discharged home after IV Rocephin today at 1 pm per MD.

## 2020-05-07 NOTE — DISCHARGE INSTRUCTIONS
DISCHARGE SUMMARY from Nurse    PATIENT INSTRUCTIONS:    After general anesthesia or intravenous sedation, for 24 hours or while taking prescription Narcotics:  · Limit your activities  · Do not drive and operate hazardous machinery  · Do not make important personal or business decisions  · Do  not drink alcoholic beverages  · If you have not urinated within 8 hours after discharge, please contact your surgeon on call. Report the following to your surgeon:  · Excessive pain, swelling, redness or odor of or around the surgical area  · Temperature over 100.5  · Nausea and vomiting lasting longer than 4 hours or if unable to take medications  · Any signs of decreased circulation or nerve impairment to extremity: change in color, persistent  numbness, tingling, coldness or increase pain  · Any questions    What to do at Home:  Recommended activity: Activity as tolerated. If you experience any of the following symptoms Difficulty urinating, fever greater than 101, increased blood in urine, or pain, please follow up with your primary care provider. *  Please give a list of your current medications to your Primary Care Provider. *  Please update this list whenever your medications are discontinued, doses are      changed, or new medications (including over-the-counter products) are added. *  Please carry medication information at all times in case of emergency situations. These are general instructions for a healthy lifestyle:    No smoking/ No tobacco products/ Avoid exposure to second hand smoke  Surgeon General's Warning:  Quitting smoking now greatly reduces serious risk to your health.     Obesity, smoking, and sedentary lifestyle greatly increases your risk for illness    A healthy diet, regular physical exercise & weight monitoring are important for maintaining a healthy lifestyle    You may be retaining fluid if you have a history of heart failure or if you experience any of the following symptoms:  Weight gain of 3 pounds or more overnight or 5 pounds in a week, increased swelling in our hands or feet or shortness of breath while lying flat in bed. Please call your doctor as soon as you notice any of these symptoms; do not wait until your next office visit. The discharge information has been reviewed with the patient. The patient verbalized understanding. Discharge medications reviewed with the patient and appropriate educational materials and side effects teaching were provided.   ___________________________________________________________________________________________________________________________________

## 2020-05-07 NOTE — PROGRESS NOTES
Rocephin completed, PIV removed, instructed pt to call desk when her ride is here, verbalized understanding.

## 2020-05-07 NOTE — PROGRESS NOTES
Problem: Patient Education: Go to Patient Education Activity  Goal: Patient/Family Education  Outcome: Progressing Towards Goal     Problem: Pain  Goal: *Control of Pain  Outcome: Progressing Towards Goal     Problem: Patient Education: Go to Patient Education Activity  Goal: Patient/Family Education  Outcome: Progressing Towards Goal     Problem: General Medical Care Plan  Goal: *Vital signs within specified parameters  Outcome: Progressing Towards Goal  Goal: *Labs within defined limits  Outcome: Progressing Towards Goal  Goal: *Absence of infection signs and symptoms  Outcome: Progressing Towards Goal  Goal: *Optimal pain control at patient's stated goal  Outcome: Progressing Towards Goal  Goal: *Skin integrity maintained  Outcome: Progressing Towards Goal  Goal: *Fluid volume balance  Outcome: Progressing Towards Goal  Goal: *Optimize nutritional status  Outcome: Progressing Towards Goal  Goal: *Anxiety reduced or absent  Outcome: Progressing Towards Goal  Goal: *Progressive mobility and function (eg: ADL's)  Outcome: Progressing Towards Goal     Problem: Patient Education: Go to Patient Education Activity  Goal: Patient/Family Education  Outcome: Progressing Towards Goal     Problem: Nausea/Vomiting (Adult)  Goal: *Absence of nausea/vomiting  Outcome: Progressing Towards Goal     Problem: Patient Education: Go to Patient Education Activity  Goal: Patient/Family Education  Outcome: Progressing Towards Goal

## 2020-05-09 LAB
BACTERIA SPEC CULT: NORMAL
SERVICE CMNT-IMP: NORMAL

## 2020-05-10 LAB
BACTERIA SPEC CULT: NORMAL
SERVICE CMNT-IMP: NORMAL

## 2020-07-13 PROCEDURE — 81003 URINALYSIS AUTO W/O SCOPE: CPT

## 2020-07-13 PROCEDURE — 96375 TX/PRO/DX INJ NEW DRUG ADDON: CPT

## 2020-07-13 PROCEDURE — 99284 EMERGENCY DEPT VISIT MOD MDM: CPT

## 2020-07-13 PROCEDURE — 96361 HYDRATE IV INFUSION ADD-ON: CPT

## 2020-07-13 PROCEDURE — 96374 THER/PROPH/DIAG INJ IV PUSH: CPT

## 2020-07-14 ENCOUNTER — PATIENT OUTREACH (OUTPATIENT)
Dept: CASE MANAGEMENT | Age: 20
End: 2020-07-14

## 2020-07-14 ENCOUNTER — HOSPITAL ENCOUNTER (EMERGENCY)
Age: 20
Discharge: HOME | End: 2020-07-14
Attending: EMERGENCY MEDICINE
Payer: COMMERCIAL

## 2020-07-14 ENCOUNTER — APPOINTMENT (OUTPATIENT)
Dept: GENERAL RADIOLOGY | Age: 20
End: 2020-07-14
Attending: EMERGENCY MEDICINE
Payer: COMMERCIAL

## 2020-07-14 VITALS
BODY MASS INDEX: 25.19 KG/M2 | WEIGHT: 120 LBS | TEMPERATURE: 98.9 F | RESPIRATION RATE: 16 BRPM | DIASTOLIC BLOOD PRESSURE: 77 MMHG | HEART RATE: 90 BPM | HEIGHT: 58 IN | SYSTOLIC BLOOD PRESSURE: 111 MMHG | OXYGEN SATURATION: 98 %

## 2020-07-14 DIAGNOSIS — R11.2 NON-INTRACTABLE VOMITING WITH NAUSEA, UNSPECIFIED VOMITING TYPE: ICD-10-CM

## 2020-07-14 DIAGNOSIS — R50.9 FEVER, UNSPECIFIED FEVER CAUSE: Primary | ICD-10-CM

## 2020-07-14 LAB
ALBUMIN SERPL-MCNC: 3.9 G/DL (ref 3.5–5)
ALBUMIN/GLOB SERPL: 0.8 {RATIO} (ref 1.2–3.5)
ALP SERPL-CCNC: 114 U/L (ref 50–136)
ALT SERPL-CCNC: 27 U/L (ref 12–65)
ANION GAP SERPL CALC-SCNC: 10 MMOL/L (ref 7–16)
AST SERPL-CCNC: 24 U/L (ref 15–37)
BASOPHILS # BLD: 0 K/UL (ref 0–0.2)
BASOPHILS NFR BLD: 0 % (ref 0–2)
BILIRUB SERPL-MCNC: 1.8 MG/DL (ref 0.2–1.1)
BUN SERPL-MCNC: 12 MG/DL (ref 6–23)
CALCIUM SERPL-MCNC: 9.4 MG/DL (ref 8.3–10.4)
CHLORIDE SERPL-SCNC: 101 MMOL/L (ref 98–107)
CO2 SERPL-SCNC: 24 MMOL/L (ref 21–32)
CREAT SERPL-MCNC: 0.71 MG/DL (ref 0.6–1)
DIFFERENTIAL METHOD BLD: ABNORMAL
EOSINOPHIL # BLD: 0 K/UL (ref 0–0.8)
EOSINOPHIL NFR BLD: 0 % (ref 0.5–7.8)
ERYTHROCYTE [DISTWIDTH] IN BLOOD BY AUTOMATED COUNT: 12.4 % (ref 11.9–14.6)
GLOBULIN SER CALC-MCNC: 5.2 G/DL (ref 2.3–3.5)
GLUCOSE SERPL-MCNC: 113 MG/DL (ref 65–100)
HCG UR QL: NEGATIVE
HCT VFR BLD AUTO: 38.3 % (ref 35.8–46.3)
HGB BLD-MCNC: 12.3 G/DL (ref 11.7–15.4)
IMM GRANULOCYTES # BLD AUTO: 0 K/UL (ref 0–0.5)
IMM GRANULOCYTES NFR BLD AUTO: 0 % (ref 0–5)
LIPASE SERPL-CCNC: 41 U/L (ref 73–393)
LYMPHOCYTES # BLD: 1.1 K/UL (ref 0.5–4.6)
LYMPHOCYTES NFR BLD: 7 % (ref 13–44)
MCH RBC QN AUTO: 27.6 PG (ref 26.1–32.9)
MCHC RBC AUTO-ENTMCNC: 32.1 G/DL (ref 31.4–35)
MCV RBC AUTO: 85.9 FL (ref 79.6–97.8)
MONOCYTES # BLD: 0.7 K/UL (ref 0.1–1.3)
MONOCYTES NFR BLD: 5 % (ref 4–12)
NEUTS SEG # BLD: 12.6 K/UL (ref 1.7–8.2)
NEUTS SEG NFR BLD: 87 % (ref 43–78)
NRBC # BLD: 0 K/UL (ref 0–0.2)
PLATELET # BLD AUTO: 426 K/UL (ref 150–450)
PMV BLD AUTO: 10.7 FL (ref 9.4–12.3)
POTASSIUM SERPL-SCNC: 3.2 MMOL/L (ref 3.5–5.1)
PROT SERPL-MCNC: 9.1 G/DL (ref 6.3–8.2)
RBC # BLD AUTO: 4.46 M/UL (ref 4.05–5.2)
SODIUM SERPL-SCNC: 135 MMOL/L (ref 136–145)
WBC # BLD AUTO: 14.4 K/UL (ref 4.3–11.1)

## 2020-07-14 PROCEDURE — 87635 SARS-COV-2 COVID-19 AMP PRB: CPT

## 2020-07-14 PROCEDURE — 81025 URINE PREGNANCY TEST: CPT

## 2020-07-14 PROCEDURE — 71045 X-RAY EXAM CHEST 1 VIEW: CPT

## 2020-07-14 PROCEDURE — 74011250636 HC RX REV CODE- 250/636: Performed by: EMERGENCY MEDICINE

## 2020-07-14 PROCEDURE — 80053 COMPREHEN METABOLIC PANEL: CPT

## 2020-07-14 PROCEDURE — 85025 COMPLETE CBC W/AUTO DIFF WBC: CPT

## 2020-07-14 PROCEDURE — 83690 ASSAY OF LIPASE: CPT

## 2020-07-14 RX ORDER — KETOROLAC TROMETHAMINE 30 MG/ML
15 INJECTION, SOLUTION INTRAMUSCULAR; INTRAVENOUS
Status: COMPLETED | OUTPATIENT
Start: 2020-07-14 | End: 2020-07-14

## 2020-07-14 RX ORDER — ONDANSETRON 4 MG/1
4 TABLET, ORALLY DISINTEGRATING ORAL
Qty: 20 TAB | Refills: 0 | Status: SHIPPED | OUTPATIENT
Start: 2020-07-14

## 2020-07-14 RX ORDER — ONDANSETRON 2 MG/ML
4 INJECTION INTRAMUSCULAR; INTRAVENOUS
Status: COMPLETED | OUTPATIENT
Start: 2020-07-14 | End: 2020-07-14

## 2020-07-14 RX ADMIN — KETOROLAC TROMETHAMINE 15 MG: 30 INJECTION, SOLUTION INTRAMUSCULAR at 02:10

## 2020-07-14 RX ADMIN — SODIUM CHLORIDE 1000 ML: 9 INJECTION, SOLUTION INTRAVENOUS at 02:10

## 2020-07-14 RX ADMIN — ONDANSETRON 4 MG: 2 INJECTION INTRAMUSCULAR; INTRAVENOUS at 02:10

## 2020-07-14 NOTE — ED TRIAGE NOTES
Arrives with personal face mask in place. Reports generalized body aches, chills, n/v. Onset 2 days pta. Denies cough, shortness of breath, diarrhea, urinary symptoms, abdominal pain. Reports negative pregnancy test at home. Works in [address] [place] [address], denies sick coworkers.  Wears mask at work

## 2020-07-14 NOTE — ED NOTES
I have reviewed discharge instructions with the patient. The patient verbalized understanding. Patient left ED via Discharge Method: ambulatory to Home with self. Opportunity for questions and clarification provided. Patient given 1 scripts. To continue your aftercare when you leave the hospital, you may receive an automated call from our care team to check in on how you are doing. This is a free service and part of our promise to provide the best care and service to meet your aftercare needs.  If you have questions, or wish to unsubscribe from this service please call [phone]. Thank you for Choosing our [facility].

## 2020-07-14 NOTE — ED PROVIDER NOTES
76-year-old female with history of pyelonephritis presents with 4 days of persistent vomiting and 2 days of body aches with subjective fevers. She has runny nose, but denies cough, congestion, loss of taste or smell, travel, or known exposure to coronavirus. She denies diarrhea, abdominal pain, or pain with urination. She has taken multiple negative pregnancy tests. Generalized Body Aches   Pertinent negatives include no chest pain, no abdominal pain, no headaches and no shortness of breath. No past medical history on file. Past Surgical History:   Procedure Laterality Date    HX TONSILLECTOMY           Family History:   Problem Relation Age of Onset    No Known Problems Mother     No Known Problems Father        Social History     Socioeconomic History    Marital status: SINGLE     Spouse name: Not on file    Number of children: Not on file    Years of education: Not on file    Highest education level: Not on file   Occupational History    Not on file   Social Needs    Financial resource strain: Not on file    Food insecurity     Worry: Not on file     Inability: Not on file    Transportation needs     Medical: Not on file     Non-medical: Not on file   Tobacco Use    Smoking status: Current Some Day Smoker     Years: 2.00    Smokeless tobacco: Never Used    Tobacco comment: Vap    Substance and Sexual Activity    Alcohol use:  Yes    Drug use: Never    Sexual activity: Not on file   Lifestyle    Physical activity     Days per week: Not on file     Minutes per session: Not on file    Stress: Not on file   Relationships    Social connections     Talks on phone: Not on file     Gets together: Not on file     Attends Orthodox service: Not on file     Active member of club or organization: Not on file     Attends meetings of clubs or organizations: Not on file     Relationship status: Not on file    Intimate partner violence     Fear of current or ex partner: Not on file Emotionally abused: Not on file     Physically abused: Not on file     Forced sexual activity: Not on file   Other Topics Concern    Not on file   Social History Narrative    Not on file         ALLERGIES: Patient has no known allergies. Review of Systems   Constitutional: Positive for appetite change, chills, diaphoresis, fatigue and fever. HENT: Positive for rhinorrhea. Negative for hearing loss. Eyes: Negative for visual disturbance. Respiratory: Negative for cough and shortness of breath. Cardiovascular: Negative for chest pain and palpitations. Gastrointestinal: Positive for nausea and vomiting. Negative for abdominal pain and diarrhea. Genitourinary: Negative for dysuria and hematuria. Musculoskeletal: Positive for arthralgias, back pain and myalgias. Skin: Negative for rash. Neurological: Negative for weakness and headaches. Psychiatric/Behavioral: Negative for confusion. Vitals:    07/14/20 0015 07/14/20 0141   BP: 121/83 119/79   Pulse: (!) 115 (!) 108   Resp: 16    Temp: 99.8 °F (37.7 °C)    SpO2: 97% 97%   Weight: 54.4 kg (120 lb)    Height: 4' 10\" (1.473 m)             Physical Exam  Vitals signs and nursing note reviewed. Constitutional:       Appearance: She is well-developed. HENT:      Head: Normocephalic and atraumatic. Eyes:      Pupils: Pupils are equal, round, and reactive to light. Neck:      Musculoskeletal: Normal range of motion and neck supple. Cardiovascular:      Rate and Rhythm: Regular rhythm. Tachycardia present. Heart sounds: Normal heart sounds. Pulmonary:      Effort: Pulmonary effort is normal.      Breath sounds: Normal breath sounds. Abdominal:      Palpations: Abdomen is soft. Tenderness: There is no abdominal tenderness. There is no right CVA tenderness or left CVA tenderness. Musculoskeletal: Normal range of motion. Skin:     General: Skin is warm and dry. Neurological:      Mental Status: She is alert. Psychiatric:         Behavior: Behavior normal.          MDM  Number of Diagnoses or Management Options  Diagnosis management comments: Parts of this document were created using dragon voice recognition software. The chart has been reviewed but errors may still be present. I wore appropriate PPE throughout this patient's ED visit. Estella Esqueda MD, 1:59 AM    2:38 AM  Urine negative for infection. Covid swab sent. Clear CXR. Will dc. Leukocytosis, but no abdominal tenderness or vaginal discharge. Chronic elevation in bili without RUQ pain. No current indication for antibiotics     I discussed the results of all labs, procedures, radiographs, and treatments with the patient and available family. Treatment plan is agreed upon and the patient is ready for discharge. Questions about treatment in the ED and differential diagnosis of presenting condition were answered. Patient was given verbal discharge instructions including, but not limited to, importance of returning to the emergency department for any concern of worsening or continued symptoms. Instructions were given to follow up with a primary care provider or specialist within 1-2 days. Adverse effects of medications, if prescribed, were discussed and patient was advised to refrain from significant physical activity until followed up by primary care physician and to not drive or operate heavy machinery after taking any sedating substances.                Amount and/or Complexity of Data Reviewed  Clinical lab tests: ordered and reviewed (Results for orders placed or performed during the hospital encounter of 07/14/20  -CBC WITH AUTOMATED DIFF       Result                      Value             Ref Range           WBC                         14.4 (H)          4.3 - 11.1 K*       RBC                         4.46              4.05 - 5.2 M*       HGB                         12.3              11.7 - 15.4 *       HCT                         38.3 35.8 - 46.3 %       MCV                         85.9              79.6 - 97.8 *       MCH                         27.6              26.1 - 32.9 *       MCHC                        32.1              31.4 - 35.0 *       RDW                         12.4              11.9 - 14.6 %       PLATELET                    426               150 - 450 K/*       MPV                         10.7              9.4 - 12.3 FL       ABSOLUTE NRBC               0.00              0.0 - 0.2 K/*       DF                          AUTOMATED                             NEUTROPHILS                 87 (H)            43 - 78 %           LYMPHOCYTES                 7 (L)             13 - 44 %           MONOCYTES                   5                 4.0 - 12.0 %        EOSINOPHILS                 0 (L)             0.5 - 7.8 %         BASOPHILS                   0                 0.0 - 2.0 %         IMMATURE GRANULOCYTES       0                 0.0 - 5.0 %         ABS. NEUTROPHILS            12.6 (H)          1.7 - 8.2 K/*       ABS. LYMPHOCYTES            1.1               0.5 - 4.6 K/*       ABS. MONOCYTES              0.7               0.1 - 1.3 K/*       ABS. EOSINOPHILS            0.0               0.0 - 0.8 K/*       ABS. BASOPHILS              0.0               0.0 - 0.2 K/*       ABS. IMM.  GRANS.            0.0               0.0 - 0.5 K/*  -METABOLIC PANEL, COMPREHENSIVE       Result                      Value             Ref Range           Sodium                      135 (L)           136 - 145 mm*       Potassium                   3.2 (L)           3.5 - 5.1 mm*       Chloride                    101               98 - 107 mmo*       CO2                         24                21 - 32 mmol*       Anion gap                   10                7 - 16 mmol/L       Glucose                     113 (H)           65 - 100 mg/*       BUN                         12                6 - 23 MG/DL        Creatinine                  0.71 0.6 - 1.0 MG*       GFR est AA                  >60               >60 ml/min/1*       GFR est non-AA              >60               >60 ml/min/1*       Calcium                     9.4               8.3 - 10.4 M*       Bilirubin, total            1.8 (H)           0.2 - 1.1 MG*       ALT (SGPT)                  27                12 - 65 U/L         AST (SGOT)                  24                15 - 37 U/L         Alk.  phosphatase            114               50 - 136 U/L        Protein, total              9.1 (H)           6.3 - 8.2 g/*       Albumin                     3.9               3.5 - 5.0 g/*       Globulin                    5.2 (H)           2.3 - 3.5 g/*       A-G Ratio                   0.8 (L)           1.2 - 3.5      -HCG URINE, QL. - POC       Result                      Value             Ref Range           Pregnancy test,urine (*     Negative          NEG            )  Tests in the medicine section of CPT®: reviewed and ordered           Procedures

## 2020-07-14 NOTE — DISCHARGE INSTRUCTIONS
You will be called with results of your COVID test.  Remain isolated until that time. Take Tylenol and Aleve as needed for fever. Take Zofran as needed for persistent nausea or vomiting. Return for worsening or concerning symptoms such as dehydration or shortness of breath.

## 2020-07-15 NOTE — PROGRESS NOTES
Patient contacted regarding recent discharge and COVID-19 risk. Discussed COVID-19 related testing which was pending at this time. Test results were pending. Patient informed of results, if available? N/A    Care Transition Nurse contacted the patient by telephone to perform post discharge assessment. Verified name and  with patient as identifiers. Patient has following risk factors of: no known risk factors. CTN reviewed discharge instructions, medical action plan and red flags related to discharge diagnosis. Reviewed and educated them on any new and changed medications related to discharge diagnosis. Advised obtaining a 90-day supply of all daily and as-needed medications. Education provided regarding infection prevention, and signs and symptoms of COVID-19 and when to seek medical attention with patient who verbalized understanding. Discussed exposure protocols and quarantine from [address] you at higher risk for severe illness  and given an opportunity for questions and concerns. The patient agrees to contact the COVID-19 hotline [phone] or PCP office for questions related to their healthcare. CTN/ACM provided contact information for future reference. From CDC: Are you at higher risk for severe illness?  Wash your hands often.  Avoid close contact (6 feet, which is about two arm lengths) with people who are sick.  Put distance between yourself and other people if COVID-19 is spreading in your community.  Clean and disinfect frequently touched surfaces.  Avoid all cruise travel and non-essential air travel.  Call your healthcare professional if you have concerns about COVID-19 and your underlying condition or if you are sick.     For more information on steps you can take to protect yourself, see CDC's How to Protect Yourself      Patient/family/caregiver given information for [facility] and agrees to enroll no  Patient's preferred e-mail:  N/A  Patient's preferred phone number: N/A  Based on Loop alert triggers, patient will be contacted by nurse care manager for worsening symptoms: declines enrollment    Plan for follow-up call in 7-14 days based on severity of symptoms and risk factors. This note will not be viewable in [address]

## 2020-07-16 LAB
SARS COV-2, XPGCVT: NEGATIVE
SOURCE, COVRS: NORMAL

## 2020-07-17 NOTE — PROGRESS NOTES
7/17/20 1st attempt to notify of negative Covid results. No mailbox set up and unable to leave message requesting return call. Certified letter sent.

## 2020-07-30 ENCOUNTER — PATIENT OUTREACH (OUTPATIENT)
Dept: CASE MANAGEMENT | Age: 20
End: 2020-07-30

## 2020-08-01 NOTE — PROGRESS NOTES
No voicemail set up. Plan: close if no call back in 3-5 days. This note will not be viewable in [address]

## 2020-08-07 ENCOUNTER — PATIENT OUTREACH (OUTPATIENT)
Dept: CASE MANAGEMENT | Age: 20
End: 2020-08-07